# Patient Record
Sex: FEMALE | Race: BLACK OR AFRICAN AMERICAN | NOT HISPANIC OR LATINO | Employment: FULL TIME | URBAN - METROPOLITAN AREA
[De-identification: names, ages, dates, MRNs, and addresses within clinical notes are randomized per-mention and may not be internally consistent; named-entity substitution may affect disease eponyms.]

---

## 2018-03-13 LAB
HUMAN PAPILLOMAVIRUS (HPV): NORMAL
PAP RECOMMENDATION EXT: NORMAL
PAP SMEAR: NORMAL

## 2020-09-16 ENCOUNTER — LAB VISIT (OUTPATIENT)
Dept: LAB | Facility: OTHER | Age: 40
End: 2020-09-16
Payer: MEDICAID

## 2020-09-16 DIAGNOSIS — Z03.818 ENCOUNTER FOR OBSERVATION FOR SUSPECTED EXPOSURE TO OTHER BIOLOGICAL AGENTS RULED OUT: ICD-10-CM

## 2020-09-16 PROCEDURE — U0003 INFECTIOUS AGENT DETECTION BY NUCLEIC ACID (DNA OR RNA); SEVERE ACUTE RESPIRATORY SYNDROME CORONAVIRUS 2 (SARS-COV-2) (CORONAVIRUS DISEASE [COVID-19]), AMPLIFIED PROBE TECHNIQUE, MAKING USE OF HIGH THROUGHPUT TECHNOLOGIES AS DESCRIBED BY CMS-2020-01-R: HCPCS

## 2020-09-17 LAB — SARS-COV-2 RNA RESP QL NAA+PROBE: NOT DETECTED

## 2020-09-22 ENCOUNTER — LAB VISIT (OUTPATIENT)
Dept: LAB | Facility: OTHER | Age: 40
End: 2020-09-22
Payer: MEDICAID

## 2020-09-22 DIAGNOSIS — Z03.818 ENCOUNTER FOR OBSERVATION FOR SUSPECTED EXPOSURE TO OTHER BIOLOGICAL AGENTS RULED OUT: ICD-10-CM

## 2020-09-22 PROCEDURE — U0003 INFECTIOUS AGENT DETECTION BY NUCLEIC ACID (DNA OR RNA); SEVERE ACUTE RESPIRATORY SYNDROME CORONAVIRUS 2 (SARS-COV-2) (CORONAVIRUS DISEASE [COVID-19]), AMPLIFIED PROBE TECHNIQUE, MAKING USE OF HIGH THROUGHPUT TECHNOLOGIES AS DESCRIBED BY CMS-2020-01-R: HCPCS

## 2020-09-23 LAB — SARS-COV-2 RNA RESP QL NAA+PROBE: NOT DETECTED

## 2020-10-06 ENCOUNTER — LAB VISIT (OUTPATIENT)
Dept: LAB | Facility: OTHER | Age: 40
End: 2020-10-06
Payer: COMMERCIAL

## 2020-10-06 DIAGNOSIS — Z03.818 ENCOUNTER FOR OBSERVATION FOR SUSPECTED EXPOSURE TO OTHER BIOLOGICAL AGENTS RULED OUT: ICD-10-CM

## 2020-10-06 PROCEDURE — U0003 INFECTIOUS AGENT DETECTION BY NUCLEIC ACID (DNA OR RNA); SEVERE ACUTE RESPIRATORY SYNDROME CORONAVIRUS 2 (SARS-COV-2) (CORONAVIRUS DISEASE [COVID-19]), AMPLIFIED PROBE TECHNIQUE, MAKING USE OF HIGH THROUGHPUT TECHNOLOGIES AS DESCRIBED BY CMS-2020-01-R: HCPCS

## 2020-10-07 LAB — SARS-COV-2 RNA RESP QL NAA+PROBE: NOT DETECTED

## 2020-10-13 ENCOUNTER — LAB VISIT (OUTPATIENT)
Dept: LAB | Facility: OTHER | Age: 40
End: 2020-10-13
Attending: INTERNAL MEDICINE
Payer: COMMERCIAL

## 2020-10-13 DIAGNOSIS — Z03.818 ENCOUNTER FOR OBSERVATION FOR SUSPECTED EXPOSURE TO OTHER BIOLOGICAL AGENTS RULED OUT: ICD-10-CM

## 2020-10-13 PROCEDURE — U0003 INFECTIOUS AGENT DETECTION BY NUCLEIC ACID (DNA OR RNA); SEVERE ACUTE RESPIRATORY SYNDROME CORONAVIRUS 2 (SARS-COV-2) (CORONAVIRUS DISEASE [COVID-19]), AMPLIFIED PROBE TECHNIQUE, MAKING USE OF HIGH THROUGHPUT TECHNOLOGIES AS DESCRIBED BY CMS-2020-01-R: HCPCS

## 2020-10-14 LAB — SARS-COV-2 RNA RESP QL NAA+PROBE: NOT DETECTED

## 2020-10-20 ENCOUNTER — LAB VISIT (OUTPATIENT)
Dept: LAB | Facility: OTHER | Age: 40
End: 2020-10-20
Payer: COMMERCIAL

## 2020-10-20 DIAGNOSIS — Z03.818 ENCOUNTER FOR OBSERVATION FOR SUSPECTED EXPOSURE TO OTHER BIOLOGICAL AGENTS RULED OUT: ICD-10-CM

## 2020-10-20 PROCEDURE — U0003 INFECTIOUS AGENT DETECTION BY NUCLEIC ACID (DNA OR RNA); SEVERE ACUTE RESPIRATORY SYNDROME CORONAVIRUS 2 (SARS-COV-2) (CORONAVIRUS DISEASE [COVID-19]), AMPLIFIED PROBE TECHNIQUE, MAKING USE OF HIGH THROUGHPUT TECHNOLOGIES AS DESCRIBED BY CMS-2020-01-R: HCPCS

## 2020-10-22 LAB — SARS-COV-2 RNA RESP QL NAA+PROBE: NOT DETECTED

## 2020-10-27 ENCOUNTER — LAB VISIT (OUTPATIENT)
Dept: LAB | Facility: OTHER | Age: 40
End: 2020-10-27
Attending: INTERNAL MEDICINE
Payer: COMMERCIAL

## 2020-10-27 DIAGNOSIS — Z03.818 ENCOUNTER FOR OBSERVATION FOR SUSPECTED EXPOSURE TO OTHER BIOLOGICAL AGENTS RULED OUT: ICD-10-CM

## 2020-10-27 PROCEDURE — U0003 INFECTIOUS AGENT DETECTION BY NUCLEIC ACID (DNA OR RNA); SEVERE ACUTE RESPIRATORY SYNDROME CORONAVIRUS 2 (SARS-COV-2) (CORONAVIRUS DISEASE [COVID-19]), AMPLIFIED PROBE TECHNIQUE, MAKING USE OF HIGH THROUGHPUT TECHNOLOGIES AS DESCRIBED BY CMS-2020-01-R: HCPCS

## 2020-10-28 LAB — SARS-COV-2 RNA RESP QL NAA+PROBE: NOT DETECTED

## 2020-11-03 ENCOUNTER — HOSPITAL ENCOUNTER (EMERGENCY)
Facility: HOSPITAL | Age: 40
Discharge: HOME | End: 2020-11-03
Attending: EMERGENCY MEDICINE
Payer: COMMERCIAL

## 2020-11-03 VITALS
BODY MASS INDEX: 45.99 KG/M2 | TEMPERATURE: 99 F | DIASTOLIC BLOOD PRESSURE: 99 MMHG | HEIGHT: 67 IN | SYSTOLIC BLOOD PRESSURE: 156 MMHG | OXYGEN SATURATION: 97 % | RESPIRATION RATE: 20 BRPM | WEIGHT: 293 LBS | HEART RATE: 82 BPM

## 2020-11-03 DIAGNOSIS — M54.31 SCIATICA OF RIGHT SIDE: Primary | ICD-10-CM

## 2020-11-03 LAB
B-HCG UR QL: NEGATIVE
CTP QC/QA: YES

## 2020-11-03 PROCEDURE — 81025 URINE PREGNANCY TEST: CPT | Performed by: NURSE PRACTITIONER

## 2020-11-03 PROCEDURE — 96372 THER/PROPH/DIAG INJ SC/IM: CPT | Mod: 59

## 2020-11-03 PROCEDURE — 99284 EMERGENCY DEPT VISIT MOD MDM: CPT | Mod: 25

## 2020-11-03 PROCEDURE — 63600175 PHARM REV CODE 636 W HCPCS: Performed by: NURSE PRACTITIONER

## 2020-11-03 RX ORDER — METHYLPREDNISOLONE 4 MG/1
TABLET ORAL
Qty: 1 PACKAGE | Refills: 0 | Status: SHIPPED | OUTPATIENT
Start: 2020-11-03 | End: 2023-12-18

## 2020-11-03 RX ORDER — DEXAMETHASONE SODIUM PHOSPHATE 4 MG/ML
10 INJECTION, SOLUTION INTRA-ARTICULAR; INTRALESIONAL; INTRAMUSCULAR; INTRAVENOUS; SOFT TISSUE
Status: COMPLETED | OUTPATIENT
Start: 2020-11-03 | End: 2020-11-03

## 2020-11-03 RX ADMIN — DEXAMETHASONE SODIUM PHOSPHATE 10 MG: 4 INJECTION, SOLUTION INTRAMUSCULAR; INTRAVENOUS at 03:11

## 2020-11-03 NOTE — ED PROVIDER NOTES
Encounter Date: 11/3/2020       History     Chief Complaint   Patient presents with    Back Pain     ITS MY SCIATIC NERVE     Presents with complaint of low back pain radiating down right leg.  Onset PTA  Pt has history of sciatic nerve pain.  Denies injury         Review of patient's allergies indicates:  No Known Allergies  Past Medical History:   Diagnosis Date    Hypertension     Obese     Sciatica      History reviewed. No pertinent surgical history.  No family history on file.  Social History     Tobacco Use    Smoking status: Never Smoker   Substance Use Topics    Alcohol use: Yes    Drug use: Not on file     Review of Systems   Constitutional: Negative for fever.   Respiratory: Negative for cough, shortness of breath and wheezing.    Cardiovascular: Negative for chest pain, palpitations and leg swelling.   Gastrointestinal: Negative for abdominal pain, diarrhea, nausea and vomiting.   Genitourinary: Negative for dysuria.   Musculoskeletal: Positive for back pain.        Radiating down right leg.   Skin: Negative for rash.   Neurological: Negative for weakness.       Physical Exam     Initial Vitals [11/03/20 1510]   BP Pulse Resp Temp SpO2   (!) 156/99 82 20 98.5 °F (36.9 °C) 97 %      MAP       --         Physical Exam    Constitutional: She appears well-developed and well-nourished.   HENT:   Head: Normocephalic and atraumatic.   Mouth/Throat: Oropharynx is clear and moist.   Eyes: Conjunctivae are normal.   Neck: Normal range of motion.   Cardiovascular: Normal rate, regular rhythm and normal heart sounds.   Pulmonary/Chest: Breath sounds normal. No respiratory distress. She has no wheezes. She has no rhonchi.   Musculoskeletal: Normal range of motion.      Comments: Straight leg testing neg bilateral  Neg bony tenderness  Neg evidence of muscle spasm.  Pt is morbidly obese   Neurological: She is alert and oriented to person, place, and time. No sensory deficit. GCS score is 15. GCS eye subscore is  4. GCS verbal subscore is 5. GCS motor subscore is 6.   Skin: Skin is warm and dry. Capillary refill takes less than 2 seconds.   Psychiatric: She has a normal mood and affect. Thought content normal.         ED Course   Procedures  Labs Reviewed   POCT URINE PREGNANCY          Imaging Results    None          Medical Decision Making:   Initial Assessment:   Presents with complaint of low back pain radiating down right leg.  Pt has history of sciatic nerve pain.  Denies injury  Differential Diagnosis:   Back pain  ED Management:  Pt has been given steroid injection here in the ED    She reports a decrease in her pain level  She will be given medrol dose pain for use at home.  She is ambulatory per self   Have discussed this pt with Dr. Alejandra                               Clinical Impression:     ICD-10-CM ICD-9-CM   1. Sciatica of right side  M54.31 724.3                          ED Disposition Condition    Discharge Stable        ED Prescriptions     Medication Sig Dispense Start Date End Date Auth. Provider    methylPREDNISolone (MEDROL DOSEPACK) 4 mg tablet Take as directed 1 Package 11/3/2020  Ирина Young NP        Follow-up Information     Follow up With Specialties Details Why Contact Info      In 2 days  Make a follow up appointment with your PCP                                       Ирина Young NP  11/03/20 6400

## 2020-11-06 ENCOUNTER — HOSPITAL ENCOUNTER (OUTPATIENT)
Dept: RADIOLOGY | Facility: HOSPITAL | Age: 40
Discharge: HOME | End: 2020-11-06
Attending: NURSE PRACTITIONER
Payer: COMMERCIAL

## 2020-11-06 DIAGNOSIS — M54.2 CERVICALGIA: ICD-10-CM

## 2020-11-06 DIAGNOSIS — M54.2 CERVICALGIA: Primary | ICD-10-CM

## 2020-11-06 PROCEDURE — 72110 X-RAY EXAM L-2 SPINE 4/>VWS: CPT | Mod: TC,PO

## 2020-11-09 ENCOUNTER — HOSPITAL ENCOUNTER (OUTPATIENT)
Dept: RADIOLOGY | Facility: HOSPITAL | Age: 40
Discharge: HOME | End: 2020-11-09
Attending: NURSE PRACTITIONER
Payer: COMMERCIAL

## 2020-11-09 DIAGNOSIS — M46.1 SACROILIITIS, NOT ELSEWHERE CLASSIFIED: ICD-10-CM

## 2020-11-09 DIAGNOSIS — M54.00 PANNICULITIS AFFECTING BACK: ICD-10-CM

## 2020-11-09 DIAGNOSIS — M54.50 LUMBAGO: ICD-10-CM

## 2020-11-09 DIAGNOSIS — T84.110A: ICD-10-CM

## 2020-11-09 DIAGNOSIS — M54.50 LUMBAGO: Primary | ICD-10-CM

## 2020-11-09 PROCEDURE — 73552 X-RAY EXAM OF FEMUR 2/>: CPT | Mod: TC,RT

## 2020-11-09 PROCEDURE — 73502 X-RAY EXAM HIP UNI 2-3 VIEWS: CPT | Mod: TC,RT

## 2020-11-17 ENCOUNTER — LAB VISIT (OUTPATIENT)
Dept: LAB | Facility: OTHER | Age: 40
End: 2020-11-17
Payer: COMMERCIAL

## 2020-11-17 DIAGNOSIS — Z03.818 ENCOUNTER FOR OBSERVATION FOR SUSPECTED EXPOSURE TO OTHER BIOLOGICAL AGENTS RULED OUT: ICD-10-CM

## 2020-11-17 PROCEDURE — U0003 INFECTIOUS AGENT DETECTION BY NUCLEIC ACID (DNA OR RNA); SEVERE ACUTE RESPIRATORY SYNDROME CORONAVIRUS 2 (SARS-COV-2) (CORONAVIRUS DISEASE [COVID-19]), AMPLIFIED PROBE TECHNIQUE, MAKING USE OF HIGH THROUGHPUT TECHNOLOGIES AS DESCRIBED BY CMS-2020-01-R: HCPCS

## 2020-11-19 LAB — SARS-COV-2 RNA RESP QL NAA+PROBE: NOT DETECTED

## 2020-11-24 ENCOUNTER — LAB VISIT (OUTPATIENT)
Dept: LAB | Facility: OTHER | Age: 40
End: 2020-11-24
Payer: COMMERCIAL

## 2020-11-24 DIAGNOSIS — Z03.818 ENCOUNTER FOR OBSERVATION FOR SUSPECTED EXPOSURE TO OTHER BIOLOGICAL AGENTS RULED OUT: ICD-10-CM

## 2020-11-24 PROCEDURE — U0003 INFECTIOUS AGENT DETECTION BY NUCLEIC ACID (DNA OR RNA); SEVERE ACUTE RESPIRATORY SYNDROME CORONAVIRUS 2 (SARS-COV-2) (CORONAVIRUS DISEASE [COVID-19]), AMPLIFIED PROBE TECHNIQUE, MAKING USE OF HIGH THROUGHPUT TECHNOLOGIES AS DESCRIBED BY CMS-2020-01-R: HCPCS

## 2020-11-27 LAB — SARS-COV-2 RNA RESP QL NAA+PROBE: NOT DETECTED

## 2020-12-01 ENCOUNTER — LAB VISIT (OUTPATIENT)
Dept: LAB | Facility: OTHER | Age: 40
End: 2020-12-01
Payer: COMMERCIAL

## 2020-12-01 DIAGNOSIS — Z03.818 ENCOUNTER FOR OBSERVATION FOR SUSPECTED EXPOSURE TO OTHER BIOLOGICAL AGENTS RULED OUT: ICD-10-CM

## 2020-12-01 PROCEDURE — U0003 INFECTIOUS AGENT DETECTION BY NUCLEIC ACID (DNA OR RNA); SEVERE ACUTE RESPIRATORY SYNDROME CORONAVIRUS 2 (SARS-COV-2) (CORONAVIRUS DISEASE [COVID-19]), AMPLIFIED PROBE TECHNIQUE, MAKING USE OF HIGH THROUGHPUT TECHNOLOGIES AS DESCRIBED BY CMS-2020-01-R: HCPCS

## 2020-12-03 LAB — SARS-COV-2 RNA RESP QL NAA+PROBE: NOT DETECTED

## 2020-12-08 ENCOUNTER — LAB VISIT (OUTPATIENT)
Dept: LAB | Facility: OTHER | Age: 40
End: 2020-12-08
Payer: COMMERCIAL

## 2020-12-08 DIAGNOSIS — Z03.818 ENCOUNTER FOR OBSERVATION FOR SUSPECTED EXPOSURE TO OTHER BIOLOGICAL AGENTS RULED OUT: ICD-10-CM

## 2020-12-08 PROCEDURE — U0003 INFECTIOUS AGENT DETECTION BY NUCLEIC ACID (DNA OR RNA); SEVERE ACUTE RESPIRATORY SYNDROME CORONAVIRUS 2 (SARS-COV-2) (CORONAVIRUS DISEASE [COVID-19]), AMPLIFIED PROBE TECHNIQUE, MAKING USE OF HIGH THROUGHPUT TECHNOLOGIES AS DESCRIBED BY CMS-2020-01-R: HCPCS

## 2020-12-10 LAB — SARS-COV-2 RNA RESP QL NAA+PROBE: NOT DETECTED

## 2020-12-15 ENCOUNTER — LAB VISIT (OUTPATIENT)
Dept: LAB | Facility: OTHER | Age: 40
End: 2020-12-15
Attending: INTERNAL MEDICINE
Payer: COMMERCIAL

## 2020-12-15 DIAGNOSIS — Z03.818 ENCOUNTER FOR OBSERVATION FOR SUSPECTED EXPOSURE TO OTHER BIOLOGICAL AGENTS RULED OUT: ICD-10-CM

## 2020-12-15 PROCEDURE — U0003 INFECTIOUS AGENT DETECTION BY NUCLEIC ACID (DNA OR RNA); SEVERE ACUTE RESPIRATORY SYNDROME CORONAVIRUS 2 (SARS-COV-2) (CORONAVIRUS DISEASE [COVID-19]), AMPLIFIED PROBE TECHNIQUE, MAKING USE OF HIGH THROUGHPUT TECHNOLOGIES AS DESCRIBED BY CMS-2020-01-R: HCPCS

## 2020-12-17 LAB — SARS-COV-2 RNA RESP QL NAA+PROBE: NOT DETECTED

## 2020-12-29 ENCOUNTER — LAB VISIT (OUTPATIENT)
Dept: LAB | Facility: OTHER | Age: 40
End: 2020-12-29
Payer: COMMERCIAL

## 2020-12-29 DIAGNOSIS — Z03.818 ENCOUNTER FOR OBSERVATION FOR SUSPECTED EXPOSURE TO OTHER BIOLOGICAL AGENTS RULED OUT: ICD-10-CM

## 2020-12-29 PROCEDURE — U0003 INFECTIOUS AGENT DETECTION BY NUCLEIC ACID (DNA OR RNA); SEVERE ACUTE RESPIRATORY SYNDROME CORONAVIRUS 2 (SARS-COV-2) (CORONAVIRUS DISEASE [COVID-19]), AMPLIFIED PROBE TECHNIQUE, MAKING USE OF HIGH THROUGHPUT TECHNOLOGIES AS DESCRIBED BY CMS-2020-01-R: HCPCS

## 2020-12-31 LAB — SARS-COV-2 RNA RESP QL NAA+PROBE: NOT DETECTED

## 2021-01-05 ENCOUNTER — LAB VISIT (OUTPATIENT)
Dept: LAB | Facility: OTHER | Age: 41
End: 2021-01-05
Payer: COMMERCIAL

## 2021-01-05 DIAGNOSIS — Z03.818 ENCOUNTER FOR OBSERVATION FOR SUSPECTED EXPOSURE TO OTHER BIOLOGICAL AGENTS RULED OUT: ICD-10-CM

## 2021-01-05 PROCEDURE — U0003 INFECTIOUS AGENT DETECTION BY NUCLEIC ACID (DNA OR RNA); SEVERE ACUTE RESPIRATORY SYNDROME CORONAVIRUS 2 (SARS-COV-2) (CORONAVIRUS DISEASE [COVID-19]), AMPLIFIED PROBE TECHNIQUE, MAKING USE OF HIGH THROUGHPUT TECHNOLOGIES AS DESCRIBED BY CMS-2020-01-R: HCPCS

## 2021-01-07 LAB — SARS-COV-2 RNA RESP QL NAA+PROBE: NOT DETECTED

## 2021-01-12 ENCOUNTER — LAB VISIT (OUTPATIENT)
Dept: LAB | Facility: OTHER | Age: 41
End: 2021-01-12
Payer: COMMERCIAL

## 2021-01-12 DIAGNOSIS — Z20.822 ENCOUNTER FOR LABORATORY TESTING FOR COVID-19 VIRUS: ICD-10-CM

## 2021-01-12 PROCEDURE — U0003 INFECTIOUS AGENT DETECTION BY NUCLEIC ACID (DNA OR RNA); SEVERE ACUTE RESPIRATORY SYNDROME CORONAVIRUS 2 (SARS-COV-2) (CORONAVIRUS DISEASE [COVID-19]), AMPLIFIED PROBE TECHNIQUE, MAKING USE OF HIGH THROUGHPUT TECHNOLOGIES AS DESCRIBED BY CMS-2020-01-R: HCPCS

## 2021-01-14 LAB — SARS-COV-2 RNA RESP QL NAA+PROBE: NOT DETECTED

## 2021-01-19 ENCOUNTER — LAB VISIT (OUTPATIENT)
Dept: LAB | Facility: OTHER | Age: 41
End: 2021-01-19
Payer: COMMERCIAL

## 2021-01-19 DIAGNOSIS — Z20.822 ENCOUNTER FOR LABORATORY TESTING FOR COVID-19 VIRUS: ICD-10-CM

## 2021-01-19 PROCEDURE — U0003 INFECTIOUS AGENT DETECTION BY NUCLEIC ACID (DNA OR RNA); SEVERE ACUTE RESPIRATORY SYNDROME CORONAVIRUS 2 (SARS-COV-2) (CORONAVIRUS DISEASE [COVID-19]), AMPLIFIED PROBE TECHNIQUE, MAKING USE OF HIGH THROUGHPUT TECHNOLOGIES AS DESCRIBED BY CMS-2020-01-R: HCPCS

## 2021-01-20 LAB — SARS-COV-2 RNA RESP QL NAA+PROBE: NOT DETECTED

## 2021-02-02 ENCOUNTER — LAB VISIT (OUTPATIENT)
Dept: LAB | Facility: OTHER | Age: 41
End: 2021-02-02
Payer: COMMERCIAL

## 2021-02-02 DIAGNOSIS — Z20.822 ENCOUNTER FOR LABORATORY TESTING FOR COVID-19 VIRUS: ICD-10-CM

## 2021-02-02 PROCEDURE — U0003 INFECTIOUS AGENT DETECTION BY NUCLEIC ACID (DNA OR RNA); SEVERE ACUTE RESPIRATORY SYNDROME CORONAVIRUS 2 (SARS-COV-2) (CORONAVIRUS DISEASE [COVID-19]), AMPLIFIED PROBE TECHNIQUE, MAKING USE OF HIGH THROUGHPUT TECHNOLOGIES AS DESCRIBED BY CMS-2020-01-R: HCPCS

## 2021-02-03 LAB — SARS-COV-2 RNA RESP QL NAA+PROBE: NOT DETECTED

## 2021-02-09 ENCOUNTER — LAB VISIT (OUTPATIENT)
Dept: LAB | Facility: OTHER | Age: 41
End: 2021-02-09
Payer: COMMERCIAL

## 2021-02-09 DIAGNOSIS — Z20.822 ENCOUNTER FOR LABORATORY TESTING FOR COVID-19 VIRUS: ICD-10-CM

## 2021-02-09 PROCEDURE — U0003 INFECTIOUS AGENT DETECTION BY NUCLEIC ACID (DNA OR RNA); SEVERE ACUTE RESPIRATORY SYNDROME CORONAVIRUS 2 (SARS-COV-2) (CORONAVIRUS DISEASE [COVID-19]), AMPLIFIED PROBE TECHNIQUE, MAKING USE OF HIGH THROUGHPUT TECHNOLOGIES AS DESCRIBED BY CMS-2020-01-R: HCPCS

## 2021-02-10 LAB — SARS-COV-2 RNA RESP QL NAA+PROBE: NOT DETECTED

## 2021-02-18 ENCOUNTER — LAB VISIT (OUTPATIENT)
Dept: LAB | Facility: OTHER | Age: 41
End: 2021-02-18
Payer: COMMERCIAL

## 2021-02-18 DIAGNOSIS — Z20.822 ENCOUNTER FOR LABORATORY TESTING FOR COVID-19 VIRUS: ICD-10-CM

## 2021-02-18 PROCEDURE — U0003 INFECTIOUS AGENT DETECTION BY NUCLEIC ACID (DNA OR RNA); SEVERE ACUTE RESPIRATORY SYNDROME CORONAVIRUS 2 (SARS-COV-2) (CORONAVIRUS DISEASE [COVID-19]), AMPLIFIED PROBE TECHNIQUE, MAKING USE OF HIGH THROUGHPUT TECHNOLOGIES AS DESCRIBED BY CMS-2020-01-R: HCPCS

## 2021-02-19 LAB — SARS-COV-2 RNA RESP QL NAA+PROBE: NOT DETECTED

## 2021-02-23 ENCOUNTER — LAB VISIT (OUTPATIENT)
Dept: LAB | Facility: OTHER | Age: 41
End: 2021-02-23
Payer: COMMERCIAL

## 2021-02-23 DIAGNOSIS — Z20.822 ENCOUNTER FOR LABORATORY TESTING FOR COVID-19 VIRUS: ICD-10-CM

## 2021-02-23 PROCEDURE — U0003 INFECTIOUS AGENT DETECTION BY NUCLEIC ACID (DNA OR RNA); SEVERE ACUTE RESPIRATORY SYNDROME CORONAVIRUS 2 (SARS-COV-2) (CORONAVIRUS DISEASE [COVID-19]), AMPLIFIED PROBE TECHNIQUE, MAKING USE OF HIGH THROUGHPUT TECHNOLOGIES AS DESCRIBED BY CMS-2020-01-R: HCPCS

## 2021-02-24 LAB — SARS-COV-2 RNA RESP QL NAA+PROBE: NOT DETECTED

## 2021-03-02 ENCOUNTER — LAB VISIT (OUTPATIENT)
Dept: LAB | Facility: OTHER | Age: 41
End: 2021-03-02
Payer: COMMERCIAL

## 2021-03-02 DIAGNOSIS — Z20.822 ENCOUNTER FOR LABORATORY TESTING FOR COVID-19 VIRUS: ICD-10-CM

## 2021-03-02 PROCEDURE — U0003 INFECTIOUS AGENT DETECTION BY NUCLEIC ACID (DNA OR RNA); SEVERE ACUTE RESPIRATORY SYNDROME CORONAVIRUS 2 (SARS-COV-2) (CORONAVIRUS DISEASE [COVID-19]), AMPLIFIED PROBE TECHNIQUE, MAKING USE OF HIGH THROUGHPUT TECHNOLOGIES AS DESCRIBED BY CMS-2020-01-R: HCPCS

## 2021-03-03 LAB — SARS-COV-2 RNA RESP QL NAA+PROBE: NOT DETECTED

## 2021-03-09 ENCOUNTER — LAB VISIT (OUTPATIENT)
Dept: LAB | Facility: OTHER | Age: 41
End: 2021-03-09
Payer: COMMERCIAL

## 2021-03-09 DIAGNOSIS — Z20.822 ENCOUNTER FOR LABORATORY TESTING FOR COVID-19 VIRUS: ICD-10-CM

## 2021-03-09 PROCEDURE — U0003 INFECTIOUS AGENT DETECTION BY NUCLEIC ACID (DNA OR RNA); SEVERE ACUTE RESPIRATORY SYNDROME CORONAVIRUS 2 (SARS-COV-2) (CORONAVIRUS DISEASE [COVID-19]), AMPLIFIED PROBE TECHNIQUE, MAKING USE OF HIGH THROUGHPUT TECHNOLOGIES AS DESCRIBED BY CMS-2020-01-R: HCPCS | Performed by: NURSE PRACTITIONER

## 2021-03-10 LAB — SARS-COV-2 RNA RESP QL NAA+PROBE: NOT DETECTED

## 2021-03-23 ENCOUNTER — LAB VISIT (OUTPATIENT)
Dept: LAB | Facility: OTHER | Age: 41
End: 2021-03-23
Payer: COMMERCIAL

## 2021-03-23 DIAGNOSIS — Z20.822 ENCOUNTER FOR LABORATORY TESTING FOR COVID-19 VIRUS: ICD-10-CM

## 2021-03-23 PROCEDURE — U0003 INFECTIOUS AGENT DETECTION BY NUCLEIC ACID (DNA OR RNA); SEVERE ACUTE RESPIRATORY SYNDROME CORONAVIRUS 2 (SARS-COV-2) (CORONAVIRUS DISEASE [COVID-19]), AMPLIFIED PROBE TECHNIQUE, MAKING USE OF HIGH THROUGHPUT TECHNOLOGIES AS DESCRIBED BY CMS-2020-01-R: HCPCS | Performed by: NURSE PRACTITIONER

## 2021-03-24 LAB — SARS-COV-2 RNA RESP QL NAA+PROBE: NOT DETECTED

## 2021-04-07 ENCOUNTER — HOSPITAL ENCOUNTER (EMERGENCY)
Facility: HOSPITAL | Age: 41
Discharge: HOME | End: 2021-04-07
Attending: EMERGENCY MEDICINE
Payer: COMMERCIAL

## 2021-04-07 VITALS
SYSTOLIC BLOOD PRESSURE: 129 MMHG | DIASTOLIC BLOOD PRESSURE: 77 MMHG | BODY MASS INDEX: 45.99 KG/M2 | WEIGHT: 293 LBS | RESPIRATION RATE: 20 BRPM | HEIGHT: 67 IN | HEART RATE: 74 BPM | OXYGEN SATURATION: 100 % | TEMPERATURE: 98 F

## 2021-04-07 DIAGNOSIS — R06.02 SOB (SHORTNESS OF BREATH): ICD-10-CM

## 2021-04-07 DIAGNOSIS — Z01.818 PRE-OP TESTING: ICD-10-CM

## 2021-04-07 DIAGNOSIS — F41.1 ANXIETY REACTION: Primary | ICD-10-CM

## 2021-04-07 LAB
ALBUMIN SERPL BCP-MCNC: 3.4 G/DL (ref 3.5–5.2)
ALP SERPL-CCNC: 48 U/L (ref 55–135)
ALT SERPL W/O P-5'-P-CCNC: 13 U/L (ref 10–44)
ANION GAP SERPL CALC-SCNC: 7 MMOL/L (ref 8–16)
AST SERPL-CCNC: 10 U/L (ref 10–40)
BASOPHILS # BLD AUTO: 0.03 K/UL (ref 0–0.2)
BASOPHILS NFR BLD: 0.4 % (ref 0–1.9)
BILIRUB SERPL-MCNC: 0.3 MG/DL (ref 0.1–1)
BUN SERPL-MCNC: 11 MG/DL (ref 6–20)
CALCIUM SERPL-MCNC: 8.9 MG/DL (ref 8.7–10.5)
CHLORIDE SERPL-SCNC: 109 MMOL/L (ref 95–110)
CO2 SERPL-SCNC: 24 MMOL/L (ref 23–29)
CREAT SERPL-MCNC: 0.9 MG/DL (ref 0.5–1.4)
DIFFERENTIAL METHOD: ABNORMAL
EOSINOPHIL # BLD AUTO: 0.2 K/UL (ref 0–0.5)
EOSINOPHIL NFR BLD: 2.2 % (ref 0–8)
ERYTHROCYTE [DISTWIDTH] IN BLOOD BY AUTOMATED COUNT: 12.9 % (ref 11.5–14.5)
EST. GFR  (AFRICAN AMERICAN): >60 ML/MIN/1.73 M^2
EST. GFR  (NON AFRICAN AMERICAN): >60 ML/MIN/1.73 M^2
GLUCOSE SERPL-MCNC: 87 MG/DL (ref 70–110)
HCT VFR BLD AUTO: 36.6 % (ref 37–48.5)
HGB BLD-MCNC: 11.9 G/DL (ref 12–16)
IMM GRANULOCYTES # BLD AUTO: 0.02 K/UL (ref 0–0.04)
IMM GRANULOCYTES NFR BLD AUTO: 0.2 % (ref 0–0.5)
LYMPHOCYTES # BLD AUTO: 3 K/UL (ref 1–4.8)
LYMPHOCYTES NFR BLD: 36.5 % (ref 18–48)
MCH RBC QN AUTO: 29.5 PG (ref 27–31)
MCHC RBC AUTO-ENTMCNC: 32.5 G/DL (ref 32–36)
MCV RBC AUTO: 91 FL (ref 82–98)
MONOCYTES # BLD AUTO: 0.6 K/UL (ref 0.3–1)
MONOCYTES NFR BLD: 6.9 % (ref 4–15)
NEUTROPHILS # BLD AUTO: 4.5 K/UL (ref 1.8–7.7)
NEUTROPHILS NFR BLD: 53.8 % (ref 38–73)
NRBC BLD-RTO: 0 /100 WBC
PLATELET # BLD AUTO: 310 K/UL (ref 150–450)
PMV BLD AUTO: 9.8 FL (ref 9.2–12.9)
POTASSIUM SERPL-SCNC: 3.8 MMOL/L (ref 3.5–5.1)
PROT SERPL-MCNC: 7 G/DL (ref 6–8.4)
RBC # BLD AUTO: 4.03 M/UL (ref 4–5.4)
SODIUM SERPL-SCNC: 140 MMOL/L (ref 136–145)
WBC # BLD AUTO: 8.3 K/UL (ref 3.9–12.7)

## 2021-04-07 PROCEDURE — 93010 EKG 12-LEAD: ICD-10-PCS | Mod: ,,, | Performed by: INTERNAL MEDICINE

## 2021-04-07 PROCEDURE — 85025 COMPLETE CBC W/AUTO DIFF WBC: CPT | Performed by: EMERGENCY MEDICINE

## 2021-04-07 PROCEDURE — 93005 ELECTROCARDIOGRAM TRACING: CPT

## 2021-04-07 PROCEDURE — 80053 COMPREHEN METABOLIC PANEL: CPT | Performed by: EMERGENCY MEDICINE

## 2021-04-07 PROCEDURE — 93010 ELECTROCARDIOGRAM REPORT: CPT | Mod: ,,, | Performed by: INTERNAL MEDICINE

## 2021-04-07 PROCEDURE — 25000003 PHARM REV CODE 250: Performed by: EMERGENCY MEDICINE

## 2021-04-07 PROCEDURE — 99285 EMERGENCY DEPT VISIT HI MDM: CPT | Mod: 25

## 2021-04-07 PROCEDURE — 36415 COLL VENOUS BLD VENIPUNCTURE: CPT | Performed by: EMERGENCY MEDICINE

## 2021-04-07 RX ORDER — AMLODIPINE BESYLATE 10 MG/1
10 TABLET ORAL DAILY
COMMUNITY
End: 2023-12-18 | Stop reason: SDUPTHER

## 2021-04-07 RX ORDER — ALPRAZOLAM 0.25 MG/1
0.25 TABLET ORAL 3 TIMES DAILY PRN
Qty: 15 TABLET | Refills: 0 | Status: SHIPPED | OUTPATIENT
Start: 2021-04-07 | End: 2023-12-18 | Stop reason: SDUPTHER

## 2021-04-07 RX ORDER — HYDROCODONE BITARTRATE AND ACETAMINOPHEN 5; 325 MG/1; MG/1
1 TABLET ORAL EVERY 6 HOURS PRN
COMMUNITY
End: 2023-12-18

## 2021-04-07 RX ORDER — AMOXICILLIN 500 MG/1
500 CAPSULE ORAL
COMMUNITY
End: 2023-12-18

## 2021-04-07 RX ORDER — IBUPROFEN 800 MG/1
800 TABLET ORAL 3 TIMES DAILY
COMMUNITY
End: 2023-12-18

## 2021-04-07 RX ORDER — ALPRAZOLAM 1 MG/1
1 TABLET ORAL
Status: COMPLETED | OUTPATIENT
Start: 2021-04-07 | End: 2021-04-07

## 2021-04-07 RX ADMIN — ALPRAZOLAM 1 MG: 1 TABLET ORAL at 08:04

## 2021-04-29 ENCOUNTER — PATIENT MESSAGE (OUTPATIENT)
Dept: RESEARCH | Facility: HOSPITAL | Age: 41
End: 2021-04-29

## 2021-07-27 ENCOUNTER — LAB VISIT (OUTPATIENT)
Dept: LAB | Facility: OTHER | Age: 41
End: 2021-07-27
Payer: COMMERCIAL

## 2021-07-27 DIAGNOSIS — Z20.822 ENCOUNTER FOR LABORATORY TESTING FOR COVID-19 VIRUS: ICD-10-CM

## 2021-07-27 PROCEDURE — U0003 INFECTIOUS AGENT DETECTION BY NUCLEIC ACID (DNA OR RNA); SEVERE ACUTE RESPIRATORY SYNDROME CORONAVIRUS 2 (SARS-COV-2) (CORONAVIRUS DISEASE [COVID-19]), AMPLIFIED PROBE TECHNIQUE, MAKING USE OF HIGH THROUGHPUT TECHNOLOGIES AS DESCRIBED BY CMS-2020-01-R: HCPCS | Performed by: NURSE PRACTITIONER

## 2021-07-29 LAB
SARS-COV-2 RNA RESP QL NAA+PROBE: NOT DETECTED
SARS-COV-2- CYCLE NUMBER: -1

## 2021-08-17 ENCOUNTER — LAB VISIT (OUTPATIENT)
Dept: LAB | Facility: OTHER | Age: 41
End: 2021-08-17
Payer: COMMERCIAL

## 2021-08-17 DIAGNOSIS — Z20.822 ENCOUNTER FOR LABORATORY TESTING FOR COVID-19 VIRUS: ICD-10-CM

## 2021-08-17 PROCEDURE — U0003 INFECTIOUS AGENT DETECTION BY NUCLEIC ACID (DNA OR RNA); SEVERE ACUTE RESPIRATORY SYNDROME CORONAVIRUS 2 (SARS-COV-2) (CORONAVIRUS DISEASE [COVID-19]), AMPLIFIED PROBE TECHNIQUE, MAKING USE OF HIGH THROUGHPUT TECHNOLOGIES AS DESCRIBED BY CMS-2020-01-R: HCPCS | Performed by: NURSE PRACTITIONER

## 2021-08-20 LAB
SARS-COV-2 RNA RESP QL NAA+PROBE: NORMAL
TEST PERFORMANCE INFO SPEC: NORMAL

## 2021-12-28 ENCOUNTER — LAB VISIT (OUTPATIENT)
Dept: LAB | Facility: OTHER | Age: 41
End: 2021-12-28
Payer: COMMERCIAL

## 2021-12-28 DIAGNOSIS — Z20.822 ENCOUNTER FOR LABORATORY TESTING FOR COVID-19 VIRUS: ICD-10-CM

## 2021-12-28 PROCEDURE — U0003 INFECTIOUS AGENT DETECTION BY NUCLEIC ACID (DNA OR RNA); SEVERE ACUTE RESPIRATORY SYNDROME CORONAVIRUS 2 (SARS-COV-2) (CORONAVIRUS DISEASE [COVID-19]), AMPLIFIED PROBE TECHNIQUE, MAKING USE OF HIGH THROUGHPUT TECHNOLOGIES AS DESCRIBED BY CMS-2020-01-R: HCPCS | Performed by: NURSE PRACTITIONER

## 2021-12-29 LAB
SARS-COV-2 RNA RESP QL NAA+PROBE: NOT DETECTED
SARS-COV-2- CYCLE NUMBER: NORMAL

## 2022-01-04 ENCOUNTER — LAB VISIT (OUTPATIENT)
Dept: LAB | Facility: OTHER | Age: 42
End: 2022-01-04
Payer: COMMERCIAL

## 2022-01-04 DIAGNOSIS — Z20.822 ENCOUNTER FOR LABORATORY TESTING FOR COVID-19 VIRUS: ICD-10-CM

## 2022-01-05 PROCEDURE — U0003 INFECTIOUS AGENT DETECTION BY NUCLEIC ACID (DNA OR RNA); SEVERE ACUTE RESPIRATORY SYNDROME CORONAVIRUS 2 (SARS-COV-2) (CORONAVIRUS DISEASE [COVID-19]), AMPLIFIED PROBE TECHNIQUE, MAKING USE OF HIGH THROUGHPUT TECHNOLOGIES AS DESCRIBED BY CMS-2020-01-R: HCPCS | Mod: ST72 | Performed by: NURSE PRACTITIONER

## 2022-01-10 LAB — SARS-COV-2 RNA RESP QL NAA+PROBE: NOT DETECTED

## 2023-12-18 ENCOUNTER — OFFICE VISIT (OUTPATIENT)
Dept: FAMILY MEDICINE | Facility: CLINIC | Age: 43
End: 2023-12-18
Attending: FAMILY MEDICINE
Payer: COMMERCIAL

## 2023-12-18 ENCOUNTER — TELEPHONE (OUTPATIENT)
Dept: FAMILY MEDICINE | Facility: CLINIC | Age: 43
End: 2023-12-18

## 2023-12-18 VITALS
WEIGHT: 260 LBS | HEART RATE: 61 BPM | BODY MASS INDEX: 40.81 KG/M2 | HEIGHT: 67 IN | DIASTOLIC BLOOD PRESSURE: 80 MMHG | OXYGEN SATURATION: 99 % | SYSTOLIC BLOOD PRESSURE: 128 MMHG

## 2023-12-18 DIAGNOSIS — Z12.31 OTHER SCREENING MAMMOGRAM: ICD-10-CM

## 2023-12-18 DIAGNOSIS — Z13.220 SCREENING CHOLESTEROL LEVEL: ICD-10-CM

## 2023-12-18 DIAGNOSIS — Z98.84 S/P BARIATRIC SURGERY: ICD-10-CM

## 2023-12-18 DIAGNOSIS — R79.9 ABNORMAL FINDING OF BLOOD CHEMISTRY, UNSPECIFIED: ICD-10-CM

## 2023-12-18 DIAGNOSIS — F41.9 ANXIETY: ICD-10-CM

## 2023-12-18 DIAGNOSIS — F51.04 PSYCHOPHYSIOLOGICAL INSOMNIA: ICD-10-CM

## 2023-12-18 DIAGNOSIS — M54.16 LUMBAR RADICULOPATHY: ICD-10-CM

## 2023-12-18 DIAGNOSIS — Z00.00 ENCOUNTER FOR MEDICAL EXAMINATION TO ESTABLISH CARE: Primary | ICD-10-CM

## 2023-12-18 DIAGNOSIS — Z13.1 SCREENING FOR DIABETES MELLITUS: ICD-10-CM

## 2023-12-18 DIAGNOSIS — I10 ESSENTIAL HYPERTENSION: ICD-10-CM

## 2023-12-18 DIAGNOSIS — R53.82 CHRONIC FATIGUE: ICD-10-CM

## 2023-12-18 DIAGNOSIS — E66.01 CLASS 3 SEVERE OBESITY WITH SERIOUS COMORBIDITY AND BODY MASS INDEX (BMI) OF 40.0 TO 44.9 IN ADULT, UNSPECIFIED OBESITY TYPE: ICD-10-CM

## 2023-12-18 PROCEDURE — 3079F DIAST BP 80-89 MM HG: CPT | Mod: CPTII,S$GLB,,

## 2023-12-18 PROCEDURE — 99204 OFFICE O/P NEW MOD 45 MIN: CPT | Mod: S$GLB,,,

## 2023-12-18 PROCEDURE — 1159F MED LIST DOCD IN RCRD: CPT | Mod: CPTII,S$GLB,,

## 2023-12-18 PROCEDURE — 3008F BODY MASS INDEX DOCD: CPT | Mod: CPTII,S$GLB,,

## 2023-12-18 PROCEDURE — 3074F SYST BP LT 130 MM HG: CPT | Mod: CPTII,S$GLB,,

## 2023-12-18 RX ORDER — GABAPENTIN 400 MG/1
400 CAPSULE ORAL 3 TIMES DAILY
COMMUNITY
Start: 2023-08-16 | End: 2023-12-18

## 2023-12-18 RX ORDER — HYDROCHLOROTHIAZIDE 12.5 MG/1
12.5 TABLET ORAL DAILY
Qty: 90 TABLET | Refills: 3 | Status: SHIPPED | OUTPATIENT
Start: 2023-12-18 | End: 2024-12-17

## 2023-12-18 RX ORDER — IBUPROFEN 100 MG/5ML
1000 SUSPENSION, ORAL (FINAL DOSE FORM) ORAL DAILY
COMMUNITY

## 2023-12-18 RX ORDER — ALPRAZOLAM 0.25 MG/1
0.25 TABLET ORAL NIGHTLY PRN
Qty: 90 TABLET | Refills: 0 | Status: SHIPPED | OUTPATIENT
Start: 2023-12-18 | End: 2023-12-18

## 2023-12-18 RX ORDER — VITAMIN B COMPLEX
1 CAPSULE ORAL DAILY
COMMUNITY

## 2023-12-18 RX ORDER — FERROUS SULFATE, DRIED 160(50) MG
1 TABLET, EXTENDED RELEASE ORAL 2 TIMES DAILY WITH MEALS
COMMUNITY

## 2023-12-18 RX ORDER — DULOXETIN HYDROCHLORIDE 30 MG/1
30 CAPSULE, DELAYED RELEASE ORAL DAILY
Qty: 30 CAPSULE | Refills: 11 | Status: SHIPPED | OUTPATIENT
Start: 2023-12-18 | End: 2024-01-29

## 2023-12-18 RX ORDER — AMLODIPINE BESYLATE 10 MG/1
10 TABLET ORAL DAILY
Qty: 90 TABLET | Refills: 3 | Status: SHIPPED | OUTPATIENT
Start: 2023-12-18 | End: 2024-12-17

## 2023-12-18 RX ORDER — DOCUSATE SODIUM 100 MG/1
100 CAPSULE, LIQUID FILLED ORAL 2 TIMES DAILY PRN
COMMUNITY

## 2023-12-18 RX ORDER — GABAPENTIN 100 MG/1
100 CAPSULE ORAL 3 TIMES DAILY PRN
Qty: 90 CAPSULE | Refills: 3 | Status: SHIPPED | OUTPATIENT
Start: 2023-12-18 | End: 2024-12-17

## 2023-12-18 RX ORDER — HYDROCHLOROTHIAZIDE 12.5 MG/1
12.5 TABLET ORAL DAILY
COMMUNITY
Start: 2023-09-16 | End: 2023-12-18 | Stop reason: SDUPTHER

## 2023-12-18 RX ORDER — ALPRAZOLAM 0.25 MG/1
0.25 TABLET ORAL NIGHTLY PRN
Qty: 90 TABLET | Refills: 0 | Status: SHIPPED | OUTPATIENT
Start: 2023-12-18 | End: 2024-01-29

## 2023-12-18 NOTE — PROGRESS NOTES
"  SUBJECTIVE:    Patient ID: Vanessa Flower is a 43 y.o. female.    Chief Complaint: Establish Care and restart medications    43 year old female here to establish care. She wants to discuss resuming some medications she has been on in the past.    Medical, surgical, family and social history reviewed, as well as current medications and allergies.    She reports she had a pap smear with Dr Mane last year, time to make an appt.   Needs a mammogram.  Having issues with anxiety, which is affecting her sleep.   She wants to discuss her weight. She has had weight loss surgery. She is still struggling to lose. Has started working out. Walking, gym sometimes, does do some weights, "trains" with her brother.  Tired all the time.  Wants to have labs to check health status.  History of hypertension. Concerned about health risks.  Has issues with "allergies" and always feels congested.  Hx of major injury to right side, hip, knee, ankle, so suffers with chronic arthritis pain. Sciatic nerve pain. Back pain  Suffers with headaches-About 5 migraines a month, sometimes in temple, sometimes in crown of head, light sensitive and gets nauseated    Hepatitis C Screening Never done  TETANUS VACCINE Never done  Mammogram due on 12/22/2024  Lipid Panel Completed     The 10-year CVD risk score (FLORIAN'Agostino, et al., 2008) is: 2.8%    Values used to calculate the score:      Age: 43 years      Sex: Female      Diabetic: No      Tobacco smoker: No      Systolic Blood Pressure: 128 mmHg      Is BP treated: Yes      HDL Cholesterol: 66 mg/dL      Total Cholesterol: 171 mg/dL         Office Visit on 12/18/2023   Component Date Value Ref Range Status    WBC 12/21/2023 3.9  3.8 - 10.8 Thousand/uL Final    RBC 12/21/2023 4.35  3.80 - 5.10 Million/uL Final    Hemoglobin 12/21/2023 13.2  11.7 - 15.5 g/dL Final    Hematocrit 12/21/2023 38.9  35.0 - 45.0 % Final    MCV 12/21/2023 89.4  80.0 - 100.0 fL Final    MCH 12/21/2023 30.3  27.0 - 33.0 pg " Final    MCHC 12/21/2023 33.9  32.0 - 36.0 g/dL Final    RDW 12/21/2023 11.9  11.0 - 15.0 % Final    Platelets 12/21/2023 304  140 - 400 Thousand/uL Final    MPV 12/21/2023 10.6  7.5 - 12.5 fL Final    Neutrophils, Abs 12/21/2023 1,658  1,500 - 7,800 cells/uL Final    Lymph # 12/21/2023 1,884  850 - 3,900 cells/uL Final    Mono # 12/21/2023 269  200 - 950 cells/uL Final    Eos # 12/21/2023 59  15 - 500 cells/uL Final    Baso # 12/21/2023 31  0 - 200 cells/uL Final    Neutrophils Relative 12/21/2023 42.5  % Final    Lymph % 12/21/2023 48.3  % Final    Mono % 12/21/2023 6.9  % Final    Eosinophil % 12/21/2023 1.5  % Final    Basophil % 12/21/2023 0.8  % Final    Iron 12/21/2023 136  40 - 190 mcg/dL Final    TIBC 12/21/2023 287  250 - 450 mcg/dL (calc) Final    Iron Saturation 12/21/2023 47 (H)  16 - 45 % (calc) Final    Ferritin 12/21/2023 75  16 - 232 ng/mL Final    Glucose 12/21/2023 90  65 - 99 mg/dL Final    BUN 12/21/2023 6 (L)  7 - 25 mg/dL Final    Creatinine 12/21/2023 0.90  0.50 - 0.99 mg/dL Final    eGFR 12/21/2023 82  > OR = 60 mL/min/1.73m2 Final    BUN/Creatinine Ratio 12/21/2023 7  6 - 22 (calc) Final    Sodium 12/21/2023 138  135 - 146 mmol/L Final    Potassium 12/21/2023 4.1  3.5 - 5.3 mmol/L Final    Chloride 12/21/2023 104  98 - 110 mmol/L Final    CO2 12/21/2023 28  20 - 32 mmol/L Final    Calcium 12/21/2023 9.2  8.6 - 10.2 mg/dL Final    Total Protein 12/21/2023 6.4  6.1 - 8.1 g/dL Final    Albumin 12/21/2023 3.9  3.6 - 5.1 g/dL Final    Globulin, Total 12/21/2023 2.5  1.9 - 3.7 g/dL (calc) Final    Albumin/Globulin Ratio 12/21/2023 1.6  1.0 - 2.5 (calc) Final    Total Bilirubin 12/21/2023 0.7  0.2 - 1.2 mg/dL Final    Alkaline Phosphatase 12/21/2023 38  31 - 125 U/L Final    AST 12/21/2023 9 (L)  10 - 30 U/L Final    ALT 12/21/2023 8  6 - 29 U/L Final    Hemoglobin A1C 12/21/2023 5.0  <5.7 % of total Hgb Final    Cholesterol 12/21/2023 171  <200 mg/dL Final    HDL 12/21/2023 66  > OR = 50 mg/dL  Final    Triglycerides 2023 55  <150 mg/dL Final    LDL Cholesterol 2023 91  mg/dL (calc) Final    HDL/Cholesterol Ratio 2023 2.6  <5.0 (calc) Final    Non HDL Chol. (LDL+VLDL) 2023 105  <130 mg/dL (calc) Final    Magnesium 2023 2.0  1.5 - 2.5 mg/dL Final    TSH 2023 0.38 (L)  mIU/L Final    T4, Free 2023 0.9  0.8 - 1.8 ng/dL Final    Vitamin D, 25-OH, Total 2023 33  30 - 100 ng/mL Final    Vitamin B-12 2023 716  200 - 1,100 pg/mL Final    Folate 2023 16.9  ng/mL Final    Creatinine, Urine 2023 298 (H)  20 - 275 mg/dL Final    Microalb, Ur 2023 0.6  See Note: mg/dL Final    Microalb/Creat Ratio 2023 2  <30 mcg/mg creat Final       Past Medical History:   Diagnosis Date    Hypertension     Obese     Panic attack     Sciatica      Social History     Socioeconomic History    Marital status: Single   Tobacco Use    Smoking status: Never   Substance and Sexual Activity    Alcohol use: Not Currently    Drug use: Never     Past Surgical History:   Procedure Laterality Date    ANKLE FRACTURE SURGERY       SECTION      CHOLECYSTECTOMY      FEMUR FRACTURE SURGERY      gastric sleeve  2023    PATELLA FRACTURE SURGERY       History reviewed. No pertinent family history.    Review of patient's allergies indicates:  No Known Allergies    Current Outpatient Medications:     ascorbic acid, vitamin C, (VITAMIN C) 1000 MG tablet, Take 1,000 mg by mouth once daily., Disp: , Rfl:     b complex vitamins capsule, Take 1 capsule by mouth once daily., Disp: , Rfl:     calcium-vitamin D3 (OS-ISSA 500 + D3) 500 mg-5 mcg (200 unit) per tablet, Take 1 tablet by mouth 2 (two) times daily with meals., Disp: , Rfl:     docusate sodium (COLACE) 100 MG capsule, Take 100 mg by mouth 2 (two) times daily as needed for Constipation., Disp: , Rfl:     multivit with iron,minerals (FLINTSTONES COMPLETE, IRON, ORAL), Take by mouth Daily., Disp: , Rfl:     ALPRAZolam  "(XANAX) 0.25 MG tablet, Take 1 tablet (0.25 mg total) by mouth nightly as needed for Anxiety or Insomnia., Disp: 90 tablet, Rfl: 0    amLODIPine (NORVASC) 10 MG tablet, Take 1 tablet (10 mg total) by mouth once daily., Disp: 90 tablet, Rfl: 3    DULoxetine (CYMBALTA) 30 MG capsule, Take 1 capsule (30 mg total) by mouth once daily., Disp: 30 capsule, Rfl: 11    gabapentin (NEURONTIN) 100 MG capsule, Take 1 capsule (100 mg total) by mouth 3 (three) times daily as needed (nerve pain)., Disp: 90 capsule, Rfl: 3    hydroCHLOROthiazide (HYDRODIURIL) 12.5 MG Tab, Take 1 tablet (12.5 mg total) by mouth once daily., Disp: 90 tablet, Rfl: 3    Review of Systems   Constitutional:  Positive for activity change and fatigue. Negative for appetite change, chills, fever and unexpected weight change.        Walking, gym sometimes, does do some weights, "trains" with her brother   HENT:  Positive for nasal congestion, postnasal drip, rhinorrhea and sinus pressure/congestion. Negative for ear pain, sore throat and trouble swallowing.    Eyes:  Negative for discharge and visual disturbance.        Blurry vision with headaches   Respiratory:  Negative for apnea, cough, shortness of breath and wheezing.    Cardiovascular:  Negative for chest pain, palpitations and leg swelling.   Gastrointestinal:  Negative for abdominal pain, blood in stool, constipation, diarrhea, nausea, vomiting and reflux.   Genitourinary:  Negative for bladder incontinence, dysuria, frequency, menstrual irregularity, menstrual problem and urgency.   Musculoskeletal:  Positive for arthralgias, back pain and leg pain. Negative for gait problem and myalgias.        Hx of major injury to right side, hip, knee, ankle, so suffers with chronic arthritis pain. Sciatic nerve pain. Back pain   Integumentary:  Negative for rash and mole/lesion.   Allergic/Immunologic: Positive for environmental allergies.   Neurological:  Positive for headaches. Negative for dizziness, " "tremors, seizures, weakness, light-headedness and numbness.        About 5 migraines a month, sometimes in temple, sometimes in crown of head, light sensitive and gets nauseated   Hematological:  Does not bruise/bleed easily.   Psychiatric/Behavioral:  Positive for dysphoric mood and sleep disturbance. Negative for suicidal ideas. The patient is nervous/anxious.            Objective:      Vitals:    12/18/23 1548   BP: 128/80   Pulse: 61   SpO2: 99%   Weight: 117.9 kg (260 lb)   Height: 5' 7" (1.702 m)     Physical Exam  Vitals and nursing note reviewed.   Constitutional:       Appearance: Normal appearance. She is well-developed and well-groomed. She is obese.   HENT:      Head: Normocephalic and atraumatic.      Right Ear: External ear normal.      Left Ear: External ear normal.      Nose: Nose normal. No rhinorrhea.      Mouth/Throat:      Mouth: Mucous membranes are moist.      Pharynx: Oropharynx is clear. No posterior oropharyngeal erythema.   Eyes:      General: No scleral icterus.     Pupils: Pupils are equal, round, and reactive to light.   Neck:      Thyroid: No thyromegaly.      Vascular: No carotid bruit.   Cardiovascular:      Rate and Rhythm: Normal rate and regular rhythm.      Heart sounds: Normal heart sounds. No murmur heard.  Pulmonary:      Effort: Pulmonary effort is normal.      Breath sounds: Normal breath sounds. No wheezing or rales.   Abdominal:      General: Bowel sounds are normal. There is no distension.      Palpations: Abdomen is soft.      Tenderness: There is no abdominal tenderness.   Musculoskeletal:         General: No tenderness or deformity. Normal range of motion.      Cervical back: Normal range of motion and neck supple.      Lumbar back: Normal. No spasms.      Right lower leg: No edema.      Left lower leg: No edema.   Skin:     General: Skin is warm and dry.      Capillary Refill: Capillary refill takes less than 2 seconds.      Coloration: Skin is not jaundiced.      " Findings: No rash.   Neurological:      General: No focal deficit present.      Mental Status: She is alert and oriented to person, place, and time.      Cranial Nerves: No cranial nerve deficit.      Sensory: No sensory deficit.      Motor: No weakness.      Coordination: Coordination normal.      Gait: Gait normal.   Psychiatric:         Mood and Affect: Mood normal.         Behavior: Behavior normal. Behavior is cooperative.         Thought Content: Thought content normal.         Judgment: Judgment normal.           Assessment:       1. Encounter for medical examination to establish care    2. Anxiety    3. Psychophysiological insomnia    4. S/P bariatric surgery    5. Chronic fatigue    6. Abnormal finding of blood chemistry, unspecified    7. Other screening mammogram    8. Essential hypertension    9. Lumbar radiculopathy    10. Screening for diabetes mellitus    11. Class 3 severe obesity with serious comorbidity and body mass index (BMI) of 40.0 to 44.9 in adult, unspecified obesity type    12. Screening cholesterol level         Plan:       Encounter for medical examination to establish care    Anxiety  Discussed previous medication and discussed option of duloxetine which could be beneficial in more than one way for her. Can be useful in patients with chronic musculoskeletal pain as well as for anxiety and depression. Patient willing to try. Discussed potential side effects. VU  -     DULoxetine (CYMBALTA) 30 MG capsule; Take 1 capsule (30 mg total) by mouth once daily.  Dispense: 30 capsule; Refill: 11  -     TSH; Future; Expected date: 12/18/2023  -     T4, Free; Future; Expected date: 12/18/2023  -     ALPRAZolam (XANAX) 0.25 MG tablet; Take 1 tablet (0.25 mg total) by mouth nightly as needed for Anxiety or Insomnia.  Dispense: 90 tablet; Refill: 0    Psychophysiological insomnia  Strictly for prn use  -     ALPRAZolam (XANAX) 0.25 MG tablet; Take 1 tablet (0.25 mg total) by mouth nightly as needed for  Anxiety or Insomnia.  Dispense: 90 tablet; Refill: 0    S/P bariatric surgery  Labs for evaluation  -     CBC Auto Differential; Future; Expected date: 12/18/2023  -     Iron and TIBC; Future; Expected date: 12/18/2023  -     Ferritin; Future; Expected date: 12/18/2023  -     Comprehensive Metabolic Panel; Future; Expected date: 12/18/2023  -     Lipid Panel; Future; Expected date: 12/18/2023  -     Magnesium; Future; Expected date: 12/18/2023  -     TSH; Future; Expected date: 12/18/2023  -     T4, Free; Future; Expected date: 12/18/2023  -     Vitamin D; Future; Expected date: 12/18/2023  -     Vitamin B12; Future; Expected date: 12/18/2023  -     Folate; Future; Expected date: 12/18/2023    Chronic fatigue  -     CBC Auto Differential; Future; Expected date: 12/18/2023  -     Iron and TIBC; Future; Expected date: 12/18/2023  -     Ferritin; Future; Expected date: 12/18/2023  -     TSH; Future; Expected date: 12/18/2023  -     T4, Free; Future; Expected date: 12/18/2023  -     Vitamin D; Future; Expected date: 12/18/2023  -     Vitamin B12; Future; Expected date: 12/18/2023  -     Folate; Future; Expected date: 12/18/2023    Abnormal finding of blood chemistry, unspecified  -     Iron and TIBC; Future; Expected date: 12/18/2023  -     Ferritin; Future; Expected date: 12/18/2023  -     Comprehensive Metabolic Panel; Future; Expected date: 12/18/2023  -     Hemoglobin A1C; Future; Expected date: 12/18/2023  -     Magnesium; Future; Expected date: 12/18/2023  -     TSH; Future; Expected date: 12/18/2023  -     T4, Free; Future; Expected date: 12/18/2023  -     Vitamin D; Future; Expected date: 12/18/2023  -     Vitamin B12; Future; Expected date: 12/18/2023  -     Folate; Future; Expected date: 12/18/2023    Other screening mammogram  -     Mammo Digital Screening Bilat w/ Jason; Future; Expected date: 12/18/2023    Essential hypertension  Continue as is. Labs for evaluation  -     amLODIPine (NORVASC) 10 MG tablet;  Take 1 tablet (10 mg total) by mouth once daily.  Dispense: 90 tablet; Refill: 3  -     hydroCHLOROthiazide (HYDRODIURIL) 12.5 MG Tab; Take 1 tablet (12.5 mg total) by mouth once daily.  Dispense: 90 tablet; Refill: 3  -     CBC Auto Differential; Future; Expected date: 12/18/2023  -     Iron and TIBC; Future; Expected date: 12/18/2023  -     Ferritin; Future; Expected date: 12/18/2023  -     Comprehensive Metabolic Panel; Future; Expected date: 12/18/2023  -     Lipid Panel; Future; Expected date: 12/18/2023  -     Magnesium; Future; Expected date: 12/18/2023  -     Microalbumin/Creatinine Ratio, Urine; Future; Expected date: 12/18/2023  -     TSH; Future; Expected date: 12/18/2023  -     T4, Free; Future; Expected date: 12/18/2023  -     Microalbumin/Creatinine Ratio, Urine    Lumbar radiculopathy  Will try gabapentin for bedtime relief and if does not make too drowsy will use prn in the day. Hoping to get some added benefit from duloxetine, as well.  -     gabapentin (NEURONTIN) 100 MG capsule; Take 1 capsule (100 mg total) by mouth 3 (three) times daily as needed (nerve pain).  Dispense: 90 capsule; Refill: 3  -     DULoxetine (CYMBALTA) 30 MG capsule; Take 1 capsule (30 mg total) by mouth once daily.  Dispense: 30 capsule; Refill: 11    Screening for diabetes mellitus  -     Hemoglobin A1C; Future; Expected date: 12/18/2023    Class 3 severe obesity with serious comorbidity and body mass index (BMI) of 40.0 to 44.9 in adult, unspecified obesity type  -     CBC Auto Differential; Future; Expected date: 12/18/2023  -     Comprehensive Metabolic Panel; Future; Expected date: 12/18/2023  -     Hemoglobin A1C; Future; Expected date: 12/18/2023  -     Lipid Panel; Future; Expected date: 12/18/2023  -     TSH; Future; Expected date: 12/18/2023  -     T4, Free; Future; Expected date: 12/18/2023    Screening cholesterol level  -     Lipid Panel; Future; Expected date: 12/18/2023      Follow up in about 4 weeks (around  1/15/2024) for new medication.        1/1/2024 Juhi Ruiz

## 2023-12-18 NOTE — TELEPHONE ENCOUNTER
----- Message from Myah Guzman sent at 12/18/2023  4:53 PM CST -----  The patient saw Juhi today. She needs to be seen in 4 weeks late pm. Pt's # [phone]

## 2023-12-22 ENCOUNTER — TELEPHONE (OUTPATIENT)
Dept: FAMILY MEDICINE | Facility: CLINIC | Age: 43
End: 2023-12-22
Payer: COMMERCIAL

## 2023-12-22 ENCOUNTER — HOSPITAL ENCOUNTER (OUTPATIENT)
Dept: RADIOLOGY | Facility: HOSPITAL | Age: 43
Discharge: HOME | End: 2023-12-22
Payer: COMMERCIAL

## 2023-12-22 DIAGNOSIS — R92.8 ABNORMAL MAMMOGRAM: Primary | ICD-10-CM

## 2023-12-22 DIAGNOSIS — Z12.31 OTHER SCREENING MAMMOGRAM: ICD-10-CM

## 2023-12-22 LAB
25(OH)D3 SERPL-MCNC: 33 NG/ML (ref 30–100)
ALBUMIN SERPL-MCNC: 3.9 G/DL (ref 3.6–5.1)
ALBUMIN/CREAT UR: 2 MCG/MG CREAT
ALBUMIN/GLOB SERPL: 1.6 (CALC) (ref 1–2.5)
ALP SERPL-CCNC: 38 U/L (ref 31–125)
ALT SERPL-CCNC: 8 U/L (ref 6–29)
AST SERPL-CCNC: 9 U/L (ref 10–30)
BASOPHILS # BLD AUTO: 31 CELLS/UL (ref 0–200)
BASOPHILS NFR BLD AUTO: 0.8 %
BILIRUB SERPL-MCNC: 0.7 MG/DL (ref 0.2–1.2)
BUN SERPL-MCNC: 6 MG/DL (ref 7–25)
BUN/CREAT SERPL: 7 (CALC) (ref 6–22)
CALCIUM SERPL-MCNC: 9.2 MG/DL (ref 8.6–10.2)
CHLORIDE SERPL-SCNC: 104 MMOL/L (ref 98–110)
CHOLEST SERPL-MCNC: 171 MG/DL
CHOLEST/HDLC SERPL: 2.6 (CALC)
CO2 SERPL-SCNC: 28 MMOL/L (ref 20–32)
CREAT SERPL-MCNC: 0.9 MG/DL (ref 0.5–0.99)
CREAT UR-MCNC: 298 MG/DL (ref 20–275)
EGFR: 82 ML/MIN/1.73M2
EOSINOPHIL # BLD AUTO: 59 CELLS/UL (ref 15–500)
EOSINOPHIL NFR BLD AUTO: 1.5 %
ERYTHROCYTE [DISTWIDTH] IN BLOOD BY AUTOMATED COUNT: 11.9 % (ref 11–15)
FERRITIN SERPL-MCNC: 75 NG/ML (ref 16–232)
FOLATE SERPL-MCNC: 16.9 NG/ML
GLOBULIN SER CALC-MCNC: 2.5 G/DL (CALC) (ref 1.9–3.7)
GLUCOSE SERPL-MCNC: 90 MG/DL (ref 65–99)
HBA1C MFR BLD: 5 % OF TOTAL HGB
HCT VFR BLD AUTO: 38.9 % (ref 35–45)
HDLC SERPL-MCNC: 66 MG/DL
HGB BLD-MCNC: 13.2 G/DL (ref 11.7–15.5)
IRON SATN MFR SERPL: 47 % (CALC) (ref 16–45)
IRON SERPL-MCNC: 136 MCG/DL (ref 40–190)
LDLC SERPL CALC-MCNC: 91 MG/DL (CALC)
LYMPHOCYTES # BLD AUTO: 1884 CELLS/UL (ref 850–3900)
LYMPHOCYTES NFR BLD AUTO: 48.3 %
MAGNESIUM SERPL-MCNC: 2 MG/DL (ref 1.5–2.5)
MCH RBC QN AUTO: 30.3 PG (ref 27–33)
MCHC RBC AUTO-ENTMCNC: 33.9 G/DL (ref 32–36)
MCV RBC AUTO: 89.4 FL (ref 80–100)
MICROALBUMIN UR-MCNC: 0.6 MG/DL
MONOCYTES # BLD AUTO: 269 CELLS/UL (ref 200–950)
MONOCYTES NFR BLD AUTO: 6.9 %
NEUTROPHILS # BLD AUTO: 1658 CELLS/UL (ref 1500–7800)
NEUTROPHILS NFR BLD AUTO: 42.5 %
NONHDLC SERPL-MCNC: 105 MG/DL (CALC)
PLATELET # BLD AUTO: 304 THOUSAND/UL (ref 140–400)
PMV BLD REES-ECKER: 10.6 FL (ref 7.5–12.5)
POTASSIUM SERPL-SCNC: 4.1 MMOL/L (ref 3.5–5.3)
PROT SERPL-MCNC: 6.4 G/DL (ref 6.1–8.1)
RBC # BLD AUTO: 4.35 MILLION/UL (ref 3.8–5.1)
SODIUM SERPL-SCNC: 138 MMOL/L (ref 135–146)
T4 FREE SERPL-MCNC: 0.9 NG/DL (ref 0.8–1.8)
TIBC SERPL-MCNC: 287 MCG/DL (CALC) (ref 250–450)
TRIGL SERPL-MCNC: 55 MG/DL
TSH SERPL-ACNC: 0.38 MIU/L
VIT B12 SERPL-MCNC: 716 PG/ML (ref 200–1100)
WBC # BLD AUTO: 3.9 THOUSAND/UL (ref 3.8–10.8)

## 2023-12-22 PROCEDURE — 77067 SCR MAMMO BI INCL CAD: CPT | Mod: TC,PO

## 2023-12-22 NOTE — TELEPHONE ENCOUNTER
----- Message from ISELA Bernard-ENIO sent at 12/22/2023 12:15 PM CST -----  Please let Ms. Flower know that I have reviewed her mammogram results and the radiologist is recommending a more in depth look due to very dense breast tissue. I am going to order a diagnostic mammogram. Nothing to be concerned about at present.

## 2023-12-22 NOTE — PROGRESS NOTES
Please let Ms. Flower know that I have reviewed her mammogram results and the radiologist is recommending a more in depth look due to very dense breast tissue. I am going to order a diagnostic mammogram. Nothing to be concerned about at present.

## 2023-12-22 NOTE — TELEPHONE ENCOUNTER
Spoke with patient gave mammo results verbatim. Patient verbalized understanding. Will await call from Imaging center.

## 2023-12-26 ENCOUNTER — TELEPHONE (OUTPATIENT)
Dept: FAMILY MEDICINE | Facility: CLINIC | Age: 43
End: 2023-12-26
Payer: COMMERCIAL

## 2023-12-26 NOTE — TELEPHONE ENCOUNTER
----- Message from Monie Garibay MA sent at 12/26/2023 11:15 AM CST -----    ----- Message -----  From: Johnna Davis  Sent: 12/26/2023  11:14 AM CST  To: [facility] Staff    Pt was calling Angy back.  [phone]

## 2023-12-26 NOTE — TELEPHONE ENCOUNTER
Spoke with pt states she os working out a lot and taking pre workout. Pt states she will stop the pre work out for now.

## 2023-12-26 NOTE — TELEPHONE ENCOUNTER
From: Johnna Davis  Sent: 12/26/2023  11:14 AM CST  To: [facility] Staff     Pt was calling Angy back.  [phone]

## 2023-12-26 NOTE — TELEPHONE ENCOUNTER
Yes. I noticed. She also has high creatinine in her urine. Someone please reach out to her and find out if there is any possibility that she could be pregnant? Her complaints were fatigue etc and I just want to be sure because of some of the medications she is on.......

## 2023-12-26 NOTE — PROGRESS NOTES
Vitamin d is low end of normal, could use supplementation, 5,000 at least, otc  Folate and b12 are normal  Cholesterol is all good  Not diabetic  Iron and complete blood cell count are all normal  Electrolytes are good  Liver functions are normal  Is there any chance she could be pregnant?

## 2024-01-01 PROBLEM — F41.9 ANXIETY: Status: ACTIVE | Noted: 2024-01-01

## 2024-01-01 PROBLEM — E66.813 CLASS 3 SEVERE OBESITY WITH SERIOUS COMORBIDITY AND BODY MASS INDEX (BMI) OF 40.0 TO 44.9 IN ADULT: Status: ACTIVE | Noted: 2024-01-01

## 2024-01-01 PROBLEM — R53.82 CHRONIC FATIGUE: Status: ACTIVE | Noted: 2024-01-01

## 2024-01-01 PROBLEM — R79.9 ABNORMAL FINDING OF BLOOD CHEMISTRY, UNSPECIFIED: Status: ACTIVE | Noted: 2024-01-01

## 2024-01-01 PROBLEM — M54.16 LUMBAR RADICULOPATHY: Status: ACTIVE | Noted: 2024-01-01

## 2024-01-01 PROBLEM — I10 ESSENTIAL HYPERTENSION: Status: ACTIVE | Noted: 2024-01-01

## 2024-01-01 PROBLEM — F51.04 PSYCHOPHYSIOLOGICAL INSOMNIA: Status: ACTIVE | Noted: 2024-01-01

## 2024-01-01 PROBLEM — E66.01 CLASS 3 SEVERE OBESITY WITH SERIOUS COMORBIDITY AND BODY MASS INDEX (BMI) OF 40.0 TO 44.9 IN ADULT: Status: ACTIVE | Noted: 2024-01-01

## 2024-01-01 PROBLEM — Z98.84 S/P BARIATRIC SURGERY: Status: ACTIVE | Noted: 2024-01-01

## 2024-01-05 ENCOUNTER — PATIENT OUTREACH (OUTPATIENT)
Dept: ADMINISTRATIVE | Facility: HOSPITAL | Age: 44
End: 2024-01-05
Payer: COMMERCIAL

## 2024-01-05 NOTE — PROGRESS NOTES
Population Health Chart Review & Patient Outreach Details    Outreach Performed: NO    Additional Pop Health Notes:           Updates Requested / Reviewed:      Updated Care Coordination Note, External Sources: [facility] and [facility], Care Team Updated, and Immunizations Reconciliation Completed or Queried: [place]         Health Maintenance Topics Overdue:    Health Maintenance Due   Topic Date Due    Hepatitis C Screening  Never done    HIV Screening  Never done    TETANUS VACCINE  Never done    Cervical Cancer Screening  03/13/2023    Influenza Vaccine (1) 09/01/2023    COVID-19 Vaccine (2 - 2023-24 season) 09/01/2023         Health Maintenance Topic(s) Outreach Outcomes & Actions Taken:    Cervical Cancer Screening - Outreach Outcomes & Actions Taken  : External Records Uploaded & Care Team Updated if Applicable

## 2024-01-25 ENCOUNTER — TELEPHONE (OUTPATIENT)
Dept: FAMILY MEDICINE | Facility: CLINIC | Age: 44
End: 2024-01-25
Payer: COMMERCIAL

## 2024-01-25 ENCOUNTER — HOSPITAL ENCOUNTER (OUTPATIENT)
Dept: RADIOLOGY | Facility: HOSPITAL | Age: 44
Discharge: HOME | End: 2024-01-25
Payer: COMMERCIAL

## 2024-01-25 DIAGNOSIS — R92.8 ABNORMAL MAMMOGRAM: ICD-10-CM

## 2024-01-25 PROCEDURE — 76642 ULTRASOUND BREAST LIMITED: CPT | Mod: TC,PO,RT

## 2024-01-25 NOTE — TELEPHONE ENCOUNTER
Spoke to pt verbatim per Juhi. Pt voiced  understanding. States she is ok to wait the 6 months. Reminder created.

## 2024-01-25 NOTE — PROGRESS NOTES
Has appt with me in 4 days but let's not make her wait for results. Let her know the ultrasound agrees with the findings on the mammogram- there is a what looks like to the radiologist an enlarged lymph node. They feel it is benign in appearance and they recommend we do repeat imaging in 6 months, but if she wants to pursue other options now, we can send her to Dr. Douglas or the [facility] for another opinion.

## 2024-01-25 NOTE — TELEPHONE ENCOUNTER
----- Message from ISELA Bernard-CNP sent at 1/25/2024  4:22 PM CST -----  Has appt with me in 4 days but let's not make her wait for results. Let her know the ultrasound agrees with the findings on the mammogram- there is a what looks like to the radiologist an enlarged lymph node. They feel it is benign in appearance and they recommend we do repeat imaging in 6 months, but if she wants to pursue other options now, we can send her to Dr. Douglas or the [facility] for another opinion.

## 2024-01-29 ENCOUNTER — OFFICE VISIT (OUTPATIENT)
Dept: FAMILY MEDICINE | Facility: CLINIC | Age: 44
End: 2024-01-29
Payer: COMMERCIAL

## 2024-01-29 VITALS
WEIGHT: 265 LBS | HEIGHT: 67 IN | BODY MASS INDEX: 41.59 KG/M2 | DIASTOLIC BLOOD PRESSURE: 66 MMHG | OXYGEN SATURATION: 99 % | SYSTOLIC BLOOD PRESSURE: 110 MMHG | HEART RATE: 81 BPM

## 2024-01-29 DIAGNOSIS — F41.0 SEVERE ANXIETY WITH PANIC: Primary | ICD-10-CM

## 2024-01-29 DIAGNOSIS — F41.9 ANXIETY: ICD-10-CM

## 2024-01-29 DIAGNOSIS — R11.0 NAUSEA: ICD-10-CM

## 2024-01-29 DIAGNOSIS — M54.16 LUMBAR RADICULOPATHY: ICD-10-CM

## 2024-01-29 PROCEDURE — 99213 OFFICE O/P EST LOW 20 MIN: CPT | Mod: S$GLB,,,

## 2024-01-29 PROCEDURE — 3078F DIAST BP <80 MM HG: CPT | Mod: CPTII,S$GLB,,

## 2024-01-29 PROCEDURE — 3074F SYST BP LT 130 MM HG: CPT | Mod: CPTII,S$GLB,,

## 2024-01-29 PROCEDURE — 3008F BODY MASS INDEX DOCD: CPT | Mod: CPTII,S$GLB,,

## 2024-01-29 RX ORDER — ALPRAZOLAM 0.5 MG/1
TABLET ORAL
Qty: 60 TABLET | Refills: 0 | Status: SHIPPED | OUTPATIENT
Start: 2024-01-29 | End: 2024-04-30

## 2024-01-29 RX ORDER — ALPRAZOLAM 0.5 MG/1
TABLET ORAL
Qty: 60 TABLET | Refills: 0 | Status: SHIPPED | OUTPATIENT
Start: 2024-03-29 | End: 2024-04-30

## 2024-01-29 RX ORDER — BUSPIRONE HYDROCHLORIDE 5 MG/1
5 TABLET ORAL 2 TIMES DAILY PRN
Qty: 180 TABLET | Refills: 3 | Status: SHIPPED | OUTPATIENT
Start: 2024-01-29 | End: 2025-01-28

## 2024-01-29 RX ORDER — DULOXETIN HYDROCHLORIDE 60 MG/1
60 CAPSULE, DELAYED RELEASE ORAL DAILY
Qty: 90 CAPSULE | Refills: 3 | Status: SHIPPED | OUTPATIENT
Start: 2024-01-29 | End: 2024-04-30

## 2024-01-29 RX ORDER — ONDANSETRON 4 MG/1
4 TABLET, ORALLY DISINTEGRATING ORAL EVERY 8 HOURS PRN
Qty: 20 TABLET | Refills: 1 | Status: SHIPPED | OUTPATIENT
Start: 2024-01-29

## 2024-01-29 RX ORDER — ALPRAZOLAM 0.5 MG/1
TABLET ORAL
Qty: 60 TABLET | Refills: 0 | Status: SHIPPED | OUTPATIENT
Start: 2024-02-29 | End: 2024-04-30

## 2024-01-29 NOTE — PROGRESS NOTES
SUBJECTIVE:    Patient ID: Vanessa Flower is a 43 y.o. female.    Chief Complaint: Follow-up (No bottles//Pt here for 4 weeks follow//discuss mammo and joint pain//Had flu vacc at job//JL)    43 year old female here to   Follow-up from previous visit.   Where we did start medication for anxiety.  Still having symptoms.    Discussed combination therapy for anxiety and depression along with chronic musculoskeletal pain by using duloxetine.  Patient agrees with plan.  Add buspirone for anxiety.  Alprazolam for severe panic.        Follow-up  Associated symptoms include arthralgias. Pertinent negatives include no chest pain, headaches, joint swelling, neck pain, vomiting or weakness.       Patient Outreach on 01/05/2024   Component Date Value Ref Range Status    PAP Recommendation External 03/13/2018 Pap in 5 years   Final    Pap 03/13/2018 Negative for intraephithelial lesion or malignancy  Negative for intraephithelial lesion or malignancy, Other Final    HPV DNA 03/13/2018 None Detected  None Detected Final   Office Visit on 12/18/2023   Component Date Value Ref Range Status    WBC 12/21/2023 3.9  3.8 - 10.8 Thousand/uL Final    RBC 12/21/2023 4.35  3.80 - 5.10 Million/uL Final    Hemoglobin 12/21/2023 13.2  11.7 - 15.5 g/dL Final    Hematocrit 12/21/2023 38.9  35.0 - 45.0 % Final    MCV 12/21/2023 89.4  80.0 - 100.0 fL Final    MCH 12/21/2023 30.3  27.0 - 33.0 pg Final    MCHC 12/21/2023 33.9  32.0 - 36.0 g/dL Final    RDW 12/21/2023 11.9  11.0 - 15.0 % Final    Platelets 12/21/2023 304  140 - 400 Thousand/uL Final    MPV 12/21/2023 10.6  7.5 - 12.5 fL Final    Neutrophils, Abs 12/21/2023 1,658  1,500 - 7,800 cells/uL Final    Lymph # 12/21/2023 1,884  850 - 3,900 cells/uL Final    Mono # 12/21/2023 269  200 - 950 cells/uL Final    Eos # 12/21/2023 59  15 - 500 cells/uL Final    Baso # 12/21/2023 31  0 - 200 cells/uL Final    Neutrophils Relative 12/21/2023 42.5  % Final    Lymph % 12/21/2023 48.3  % Final     Mono % 12/21/2023 6.9  % Final    Eosinophil % 12/21/2023 1.5  % Final    Basophil % 12/21/2023 0.8  % Final    Iron 12/21/2023 136  40 - 190 mcg/dL Final    TIBC 12/21/2023 287  250 - 450 mcg/dL (calc) Final    Iron Saturation 12/21/2023 47 (H)  16 - 45 % (calc) Final    Ferritin 12/21/2023 75  16 - 232 ng/mL Final    Glucose 12/21/2023 90  65 - 99 mg/dL Final    BUN 12/21/2023 6 (L)  7 - 25 mg/dL Final    Creatinine 12/21/2023 0.90  0.50 - 0.99 mg/dL Final    eGFR 12/21/2023 82  > OR = 60 mL/min/1.73m2 Final    BUN/Creatinine Ratio 12/21/2023 7  6 - 22 (calc) Final    Sodium 12/21/2023 138  135 - 146 mmol/L Final    Potassium 12/21/2023 4.1  3.5 - 5.3 mmol/L Final    Chloride 12/21/2023 104  98 - 110 mmol/L Final    CO2 12/21/2023 28  20 - 32 mmol/L Final    Calcium 12/21/2023 9.2  8.6 - 10.2 mg/dL Final    Total Protein 12/21/2023 6.4  6.1 - 8.1 g/dL Final    Albumin 12/21/2023 3.9  3.6 - 5.1 g/dL Final    Globulin, Total 12/21/2023 2.5  1.9 - 3.7 g/dL (calc) Final    Albumin/Globulin Ratio 12/21/2023 1.6  1.0 - 2.5 (calc) Final    Total Bilirubin 12/21/2023 0.7  0.2 - 1.2 mg/dL Final    Alkaline Phosphatase 12/21/2023 38  31 - 125 U/L Final    AST 12/21/2023 9 (L)  10 - 30 U/L Final    ALT 12/21/2023 8  6 - 29 U/L Final    Hemoglobin A1C 12/21/2023 5.0  <5.7 % of total Hgb Final    Cholesterol 12/21/2023 171  <200 mg/dL Final    HDL 12/21/2023 66  > OR = 50 mg/dL Final    Triglycerides 12/21/2023 55  <150 mg/dL Final    LDL Cholesterol 12/21/2023 91  mg/dL (calc) Final    HDL/Cholesterol Ratio 12/21/2023 2.6  <5.0 (calc) Final    Non HDL Chol. (LDL+VLDL) 12/21/2023 105  <130 mg/dL (calc) Final    Magnesium 12/21/2023 2.0  1.5 - 2.5 mg/dL Final    TSH 12/21/2023 0.38 (L)  mIU/L Final    T4, Free 12/21/2023 0.9  0.8 - 1.8 ng/dL Final    Vitamin D, 25-OH, Total 12/21/2023 33  30 - 100 ng/mL Final    Vitamin B-12 12/21/2023 716  200 - 1,100 pg/mL Final    Folate 12/21/2023 16.9  ng/mL Final    Creatinine, Urine  12/21/2023 298 (H)  20 - 275 mg/dL Final    Microalb, Ur 12/21/2023 0.6  See Note: mg/dL Final    Microalb/Creat Ratio 12/21/2023 2  <30 mcg/mg creat Final       Past Medical History:   Diagnosis Date    Hypertension     Obese     Panic attack     Sciatica      Social History     Socioeconomic History    Marital status: Single   Tobacco Use    Smoking status: Never   Substance and Sexual Activity    Alcohol use: Not Currently    Drug use: Never     Social Determinants of Health     Financial Resource Strain: Low Risk  (1/29/2024)    Overall Financial Resource Strain (CARDIA)     Difficulty of Paying Living Expenses: Not hard at all   Food Insecurity: Food Insecurity Present (1/29/2024)    Hunger Vital Sign     Worried About Running Out of Food in the Last Year: Sometimes true     Ran Out of Food in the Last Year: Sometimes true   Transportation Needs: No Transportation Needs (1/29/2024)    PRAPARE - Transportation     Lack of Transportation (Medical): No     Lack of Transportation (Non-Medical): No   Physical Activity: Insufficiently Active (1/29/2024)    Exercise Vital Sign     Days of Exercise per Week: 2 days     Minutes of Exercise per Session: 30 min   Stress: Stress Concern Present (1/29/2024)    [employer] - Occupational Stress Questionnaire     Feeling of Stress : Very much   Social Connections: Unknown (1/29/2024)    Social Connection and Isolation Panel [NHANES]     Frequency of Communication with Friends and Family: More than three times a week     Frequency of Social Gatherings with Friends and Family: Once a week     Active Member of Clubs or Organizations: No     Attends Club or Organization Meetings: Never     Marital Status: Never    Housing Stability: Low Risk  (1/29/2024)    Housing Stability Vital Sign     Unable to Pay for Housing in the Last Year: No     Number of Places Lived in the Last Year: 1     Unstable Housing in the Last Year: No     Past Surgical  History:   Procedure Laterality Date    ANKLE FRACTURE SURGERY       SECTION      CHOLECYSTECTOMY      FEMUR FRACTURE SURGERY      gastric sleeve  2023    PATELLA FRACTURE SURGERY       History reviewed. No pertinent family history.    Review of patient's allergies indicates:  No Known Allergies    Current Outpatient Medications:     amLODIPine (NORVASC) 10 MG tablet, Take 1 tablet (10 mg total) by mouth once daily., Disp: 90 tablet, Rfl: 3    ascorbic acid, vitamin C, (VITAMIN C) 1000 MG tablet, Take 1,000 mg by mouth once daily., Disp: , Rfl:     b complex vitamins capsule, Take 1 capsule by mouth once daily., Disp: , Rfl:     calcium-vitamin D3 (OS-ISSA 500 + D3) 500 mg-5 mcg (200 unit) per tablet, Take 1 tablet by mouth 2 (two) times daily with meals., Disp: , Rfl:     docusate sodium (COLACE) 100 MG capsule, Take 100 mg by mouth 2 (two) times daily as needed for Constipation., Disp: , Rfl:     gabapentin (NEURONTIN) 100 MG capsule, Take 1 capsule (100 mg total) by mouth 3 (three) times daily as needed (nerve pain)., Disp: 90 capsule, Rfl: 3    hydroCHLOROthiazide (HYDRODIURIL) 12.5 MG Tab, Take 1 tablet (12.5 mg total) by mouth once daily., Disp: 90 tablet, Rfl: 3    multivit with iron,minerals (FLINTSTONES COMPLETE, IRON, ORAL), Take by mouth Daily., Disp: , Rfl:     albuterol (VENTOLIN HFA) 90 mcg/actuation inhaler, Inhale 1-2 puffs into the lungs every 6 (six) hours as needed for Wheezing or Shortness of Breath. Rescue, Disp: 8 g, Rfl: 0    ALPRAZolam (XANAX) 0.5 MG tablet, Take a half tablet to one whole tablet twice a day as needed for acute panic or anxiety., Disp: 60 tablet, Rfl: 0    [START ON 2024] ALPRAZolam (XANAX) 0.5 MG tablet, Take a half a tablet to one tablet twice a day as needed for acute panic or anxiety, Disp: 60 tablet, Rfl: 0    [START ON 3/29/2024] ALPRAZolam (XANAX) 0.5 MG tablet, Take one half tablet to one tablet twice a day as needed for acute panic or anxiety, Disp:  "60 tablet, Rfl: 0    amoxicillin-clavulanate 875-125mg (AUGMENTIN) 875-125 mg per tablet, Take 1 tablet by mouth every 12 (twelve) hours. for 10 days, Disp: 20 tablet, Rfl: 0    busPIRone (BUSPAR) 5 MG Tab, Take 1 tablet (5 mg total) by mouth 2 (two) times daily as needed (anxiety)., Disp: 180 tablet, Rfl: 3    DULoxetine (CYMBALTA) 60 MG capsule, Take 1 capsule (60 mg total) by mouth once daily., Disp: 90 capsule, Rfl: 3    ondansetron (ZOFRAN-ODT) 4 MG TbDL, Take 1 tablet (4 mg total) by mouth every 8 (eight) hours as needed (nausea)., Disp: 20 tablet, Rfl: 1    promethazine-dextromethorphan (PROMETHAZINE-DM) 6.25-15 mg/5 mL Syrp, Take 5 mLs by mouth every 8 (eight) hours as needed (cough)., Disp: 240 mL, Rfl: 0    Review of Systems   Constitutional:  Negative for activity change and unexpected weight change.   HENT:  Negative for hearing loss, rhinorrhea and trouble swallowing.    Eyes:  Negative for discharge and visual disturbance.   Respiratory:  Negative for chest tightness and wheezing.    Cardiovascular:  Negative for chest pain and palpitations.   Gastrointestinal:  Negative for blood in stool, constipation, diarrhea and vomiting.   Endocrine: Negative for polydipsia and polyuria.   Genitourinary:  Negative for difficulty urinating, dysuria, hematuria and menstrual problem.   Musculoskeletal:  Positive for arthralgias. Negative for joint swelling and neck pain.   Neurological:  Negative for weakness and headaches.   Psychiatric/Behavioral:  Positive for sleep disturbance. Negative for confusion and dysphoric mood. The patient is nervous/anxious.            Objective:      Vitals:    01/29/24 1427   BP: 110/66   Pulse: 81   SpO2: 99%   Weight: 120.2 kg (265 lb)   Height: 5' 7" (1.702 m)     Physical Exam  Constitutional:       General: She is not in acute distress.     Appearance: She is obese.   HENT:      Head: Normocephalic and atraumatic.      Nose: Nose normal.      Mouth/Throat:      Pharynx: " Oropharynx is clear.   Eyes:      Pupils: Pupils are equal, round, and reactive to light.   Neck:      Vascular: No carotid bruit.   Cardiovascular:      Rate and Rhythm: Normal rate and regular rhythm.      Pulses: Normal pulses.      Heart sounds: Normal heart sounds.   Pulmonary:      Effort: Pulmonary effort is normal.      Breath sounds: Normal breath sounds.   Abdominal:      Palpations: Abdomen is soft.   Musculoskeletal:      Right lower leg: No edema.      Left lower leg: No edema.   Skin:     General: Skin is warm and dry.      Capillary Refill: Capillary refill takes less than 2 seconds.   Neurological:      General: No focal deficit present.      Mental Status: She is alert.   Psychiatric:         Mood and Affect: Mood normal.           Assessment:       1. Severe anxiety with panic    2. Nausea    3. Anxiety    4. Lumbar radiculopathy         Plan:       Severe anxiety with panic   Add for severe anxiety, panic attacks.  -     ALPRAZolam (XANAX) 0.5 MG tablet; Take a half tablet to one whole tablet twice a day as needed for acute panic or anxiety.  Dispense: 60 tablet; Refill: 0  -     ALPRAZolam (XANAX) 0.5 MG tablet; Take a half a tablet to one tablet twice a day as needed for acute panic or anxiety  Dispense: 60 tablet; Refill: 0  -     ALPRAZolam (XANAX) 0.5 MG tablet; Take one half tablet to one tablet twice a day as needed for acute panic or anxiety  Dispense: 60 tablet; Refill: 0    Nausea  -     ondansetron (ZOFRAN-ODT) 4 MG TbDL; Take 1 tablet (4 mg total) by mouth every 8 (eight) hours as needed (nausea).  Dispense: 20 tablet; Refill: 1    Anxiety    Adding buspirone for anxiety to be managed along with duloxetine.  -     busPIRone (BUSPAR) 5 MG Tab; Take 1 tablet (5 mg total) by mouth 2 (two) times daily as needed (anxiety).  Dispense: 180 tablet; Refill: 3    Lumbar radiculopathy    Increase duloxetine to 60 mg.  -     DULoxetine (CYMBALTA) 60 MG capsule; Take 1 capsule (60 mg total) by  mouth once daily.  Dispense: 90 capsule; Refill: 3      Follow up in about 6 weeks (around 3/11/2024).        2/21/2024 Juhi Ruiz

## 2024-02-14 ENCOUNTER — OFFICE VISIT (OUTPATIENT)
Dept: FAMILY MEDICINE | Facility: CLINIC | Age: 44
End: 2024-02-14
Payer: COMMERCIAL

## 2024-02-14 ENCOUNTER — TELEPHONE (OUTPATIENT)
Dept: FAMILY MEDICINE | Facility: CLINIC | Age: 44
End: 2024-02-14

## 2024-02-14 VITALS
HEART RATE: 81 BPM | BODY MASS INDEX: 43.49 KG/M2 | HEIGHT: 65 IN | SYSTOLIC BLOOD PRESSURE: 110 MMHG | OXYGEN SATURATION: 98 % | DIASTOLIC BLOOD PRESSURE: 68 MMHG | WEIGHT: 261 LBS | TEMPERATURE: 99 F

## 2024-02-14 DIAGNOSIS — J06.9 UPPER RESPIRATORY TRACT INFECTION, UNSPECIFIED TYPE: Primary | ICD-10-CM

## 2024-02-14 DIAGNOSIS — J03.90 TONSILLITIS: ICD-10-CM

## 2024-02-14 DIAGNOSIS — R05.1 ACUTE COUGH: ICD-10-CM

## 2024-02-14 LAB
CTP QC/QA: YES
POC MOLECULAR INFLUENZA A AGN: NEGATIVE
POC MOLECULAR INFLUENZA B AGN: NEGATIVE

## 2024-02-14 PROCEDURE — 87502 INFLUENZA DNA AMP PROBE: CPT | Mod: QW,,,

## 2024-02-14 PROCEDURE — 1159F MED LIST DOCD IN RCRD: CPT | Mod: CPTII,S$GLB,,

## 2024-02-14 PROCEDURE — 3074F SYST BP LT 130 MM HG: CPT | Mod: CPTII,S$GLB,,

## 2024-02-14 PROCEDURE — 3078F DIAST BP <80 MM HG: CPT | Mod: CPTII,S$GLB,,

## 2024-02-14 PROCEDURE — 3008F BODY MASS INDEX DOCD: CPT | Mod: CPTII,S$GLB,,

## 2024-02-14 PROCEDURE — 99213 OFFICE O/P EST LOW 20 MIN: CPT | Mod: S$GLB,,,

## 2024-02-14 RX ORDER — AMOXICILLIN AND CLAVULANATE POTASSIUM 875; 125 MG/1; MG/1
1 TABLET, FILM COATED ORAL EVERY 12 HOURS
Qty: 20 TABLET | Refills: 0 | Status: SHIPPED | OUTPATIENT
Start: 2024-02-14 | End: 2024-02-24

## 2024-02-14 RX ORDER — ALBUTEROL SULFATE 90 UG/1
1-2 AEROSOL, METERED RESPIRATORY (INHALATION) EVERY 6 HOURS PRN
Qty: 8 G | Refills: 0 | Status: SHIPPED | OUTPATIENT
Start: 2024-02-14 | End: 2025-02-13

## 2024-02-14 RX ORDER — PROMETHAZINE HYDROCHLORIDE AND DEXTROMETHORPHAN HYDROBROMIDE 6.25; 15 MG/5ML; MG/5ML
5 SYRUP ORAL EVERY 8 HOURS PRN
Qty: 240 ML | Refills: 0 | Status: SHIPPED | OUTPATIENT
Start: 2024-02-14 | End: 2024-04-30

## 2024-02-14 NOTE — PROGRESS NOTES
"  SUBJECTIVE:    Patient ID: Vanessa Flower is a 43 y.o. female.    Chief Complaint: Cough and Nasal Congestion (No bottles//Pt here for cough and chest congestion since this weekend. Some throat irritation. Denies fever, SOB. Taking mucinex. Tested for covid yesterday, was negative//JL)    43 year old established patient presents today with "cold symptoms." Symptoms started Saturday, runny nose, cough, sneezing. Moved into chest.  Has been taking mucinex multi symptom. Helps some. Now also has sore throat. No fever, but does get "chills and aches at night." Tested for Covid at home yesterday, was negative. Flu negative here today. Educated patient that Covid may still be possibility and if she wants to retest in a few days she can. If positive, let me know. Patient knows how to take universal precautions and is wearing a mask today.   States she has not been nauseated but has had so much "mucus" that she did vomit some due to this. No diarrhea. No appetite really either. Advised to hydrate.       Cough  Associated symptoms include chills, headaches, myalgias, rhinorrhea, a sore throat and wheezing.       Patient Outreach on 01/05/2024   Component Date Value Ref Range Status    PAP Recommendation External 03/13/2018 Pap in 5 years   Final    Pap 03/13/2018 Negative for intraephithelial lesion or malignancy  Negative for intraephithelial lesion or malignancy, Other Final    HPV DNA 03/13/2018 None Detected  None Detected Final   Office Visit on 12/18/2023   Component Date Value Ref Range Status    WBC 12/21/2023 3.9  3.8 - 10.8 Thousand/uL Final    RBC 12/21/2023 4.35  3.80 - 5.10 Million/uL Final    Hemoglobin 12/21/2023 13.2  11.7 - 15.5 g/dL Final    Hematocrit 12/21/2023 38.9  35.0 - 45.0 % Final    MCV 12/21/2023 89.4  80.0 - 100.0 fL Final    MCH 12/21/2023 30.3  27.0 - 33.0 pg Final    MCHC 12/21/2023 33.9  32.0 - 36.0 g/dL Final    RDW 12/21/2023 11.9  11.0 - 15.0 % Final    Platelets 12/21/2023 304  140 " - 400 Thousand/uL Final    MPV 12/21/2023 10.6  7.5 - 12.5 fL Final    Neutrophils, Abs 12/21/2023 1,658  1,500 - 7,800 cells/uL Final    Lymph # 12/21/2023 1,884  850 - 3,900 cells/uL Final    Mono # 12/21/2023 269  200 - 950 cells/uL Final    Eos # 12/21/2023 59  15 - 500 cells/uL Final    Baso # 12/21/2023 31  0 - 200 cells/uL Final    Neutrophils Relative 12/21/2023 42.5  % Final    Lymph % 12/21/2023 48.3  % Final    Mono % 12/21/2023 6.9  % Final    Eosinophil % 12/21/2023 1.5  % Final    Basophil % 12/21/2023 0.8  % Final    Iron 12/21/2023 136  40 - 190 mcg/dL Final    TIBC 12/21/2023 287  250 - 450 mcg/dL (calc) Final    Iron Saturation 12/21/2023 47 (H)  16 - 45 % (calc) Final    Ferritin 12/21/2023 75  16 - 232 ng/mL Final    Glucose 12/21/2023 90  65 - 99 mg/dL Final    BUN 12/21/2023 6 (L)  7 - 25 mg/dL Final    Creatinine 12/21/2023 0.90  0.50 - 0.99 mg/dL Final    eGFR 12/21/2023 82  > OR = 60 mL/min/1.73m2 Final    BUN/Creatinine Ratio 12/21/2023 7  6 - 22 (calc) Final    Sodium 12/21/2023 138  135 - 146 mmol/L Final    Potassium 12/21/2023 4.1  3.5 - 5.3 mmol/L Final    Chloride 12/21/2023 104  98 - 110 mmol/L Final    CO2 12/21/2023 28  20 - 32 mmol/L Final    Calcium 12/21/2023 9.2  8.6 - 10.2 mg/dL Final    Total Protein 12/21/2023 6.4  6.1 - 8.1 g/dL Final    Albumin 12/21/2023 3.9  3.6 - 5.1 g/dL Final    Globulin, Total 12/21/2023 2.5  1.9 - 3.7 g/dL (calc) Final    Albumin/Globulin Ratio 12/21/2023 1.6  1.0 - 2.5 (calc) Final    Total Bilirubin 12/21/2023 0.7  0.2 - 1.2 mg/dL Final    Alkaline Phosphatase 12/21/2023 38  31 - 125 U/L Final    AST 12/21/2023 9 (L)  10 - 30 U/L Final    ALT 12/21/2023 8  6 - 29 U/L Final    Hemoglobin A1C 12/21/2023 5.0  <5.7 % of total Hgb Final    Cholesterol 12/21/2023 171  <200 mg/dL Final    HDL 12/21/2023 66  > OR = 50 mg/dL Final    Triglycerides 12/21/2023 55  <150 mg/dL Final    LDL Cholesterol 12/21/2023 91  mg/dL (calc) Final    HDL/Cholesterol Ratio  12/21/2023 2.6  <5.0 (calc) Final    Non HDL Chol. (LDL+VLDL) 12/21/2023 105  <130 mg/dL (calc) Final    Magnesium 12/21/2023 2.0  1.5 - 2.5 mg/dL Final    TSH 12/21/2023 0.38 (L)  mIU/L Final    T4, Free 12/21/2023 0.9  0.8 - 1.8 ng/dL Final    Vitamin D, 25-OH, Total 12/21/2023 33  30 - 100 ng/mL Final    Vitamin B-12 12/21/2023 716  200 - 1,100 pg/mL Final    Folate 12/21/2023 16.9  ng/mL Final    Creatinine, Urine 12/21/2023 298 (H)  20 - 275 mg/dL Final    Microalb, Ur 12/21/2023 0.6  See Note: mg/dL Final    Microalb/Creat Ratio 12/21/2023 2  <30 mcg/mg creat Final       Past Medical History:   Diagnosis Date    Hypertension     Obese     Panic attack     Sciatica      Social History     Socioeconomic History    Marital status: Single   Tobacco Use    Smoking status: Never   Substance and Sexual Activity    Alcohol use: Not Currently    Drug use: Never     Social Determinants of Health     Financial Resource Strain: Low Risk  (1/29/2024)    Overall Financial Resource Strain (CARDIA)     Difficulty of Paying Living Expenses: Not hard at all   Food Insecurity: Food Insecurity Present (1/29/2024)    Hunger Vital Sign     Worried About Running Out of Food in the Last Year: Sometimes true     Ran Out of Food in the Last Year: Sometimes true   Transportation Needs: No Transportation Needs (1/29/2024)    PRAPARE - Transportation     Lack of Transportation (Medical): No     Lack of Transportation (Non-Medical): No   Physical Activity: Insufficiently Active (1/29/2024)    Exercise Vital Sign     Days of Exercise per Week: 2 days     Minutes of Exercise per Session: 30 min   Stress: Stress Concern Present (1/29/2024)    [employer] - Occupational Stress Questionnaire     Feeling of Stress : Very much   Social Connections: Unknown (1/29/2024)    Social Connection and Isolation Panel [NHANES]     Frequency of Communication with Friends and Family: More than three times a week     Frequency of  Social Gatherings with Friends and Family: Once a week     Active Member of Clubs or Organizations: No     Attends Club or Organization Meetings: Never     Marital Status: Never    Housing Stability: Low Risk  (2024)    Housing Stability Vital Sign     Unable to Pay for Housing in the Last Year: No     Number of Places Lived in the Last Year: 1     Unstable Housing in the Last Year: No     Past Surgical History:   Procedure Laterality Date    ANKLE FRACTURE SURGERY       SECTION      CHOLECYSTECTOMY      FEMUR FRACTURE SURGERY      gastric sleeve  2023    PATELLA FRACTURE SURGERY       History reviewed. No pertinent family history.    Review of patient's allergies indicates:  No Known Allergies    Current Outpatient Medications:     ALPRAZolam (XANAX) 0.5 MG tablet, Take a half tablet to one whole tablet twice a day as needed for acute panic or anxiety., Disp: 60 tablet, Rfl: 0    amLODIPine (NORVASC) 10 MG tablet, Take 1 tablet (10 mg total) by mouth once daily., Disp: 90 tablet, Rfl: 3    ascorbic acid, vitamin C, (VITAMIN C) 1000 MG tablet, Take 1,000 mg by mouth once daily., Disp: , Rfl:     b complex vitamins capsule, Take 1 capsule by mouth once daily., Disp: , Rfl:     busPIRone (BUSPAR) 5 MG Tab, Take 1 tablet (5 mg total) by mouth 2 (two) times daily as needed (anxiety)., Disp: 180 tablet, Rfl: 3    calcium-vitamin D3 (OS-ISSA 500 + D3) 500 mg-5 mcg (200 unit) per tablet, Take 1 tablet by mouth 2 (two) times daily with meals., Disp: , Rfl:     docusate sodium (COLACE) 100 MG capsule, Take 100 mg by mouth 2 (two) times daily as needed for Constipation., Disp: , Rfl:     DULoxetine (CYMBALTA) 60 MG capsule, Take 1 capsule (60 mg total) by mouth once daily., Disp: 90 capsule, Rfl: 3    gabapentin (NEURONTIN) 100 MG capsule, Take 1 capsule (100 mg total) by mouth 3 (three) times daily as needed (nerve pain)., Disp: 90 capsule, Rfl: 3    hydroCHLOROthiazide (HYDRODIURIL) 12.5 MG Tab,  "Take 1 tablet (12.5 mg total) by mouth once daily., Disp: 90 tablet, Rfl: 3    multivit with iron,minerals (FLINTSTONES COMPLETE, IRON, ORAL), Take by mouth Daily., Disp: , Rfl:     ondansetron (ZOFRAN-ODT) 4 MG TbDL, Take 1 tablet (4 mg total) by mouth every 8 (eight) hours as needed (nausea)., Disp: 20 tablet, Rfl: 1    albuterol (VENTOLIN HFA) 90 mcg/actuation inhaler, Inhale 1-2 puffs into the lungs every 6 (six) hours as needed for Wheezing or Shortness of Breath. Rescue, Disp: 8 g, Rfl: 0    [START ON 2/29/2024] ALPRAZolam (XANAX) 0.5 MG tablet, Take a half a tablet to one tablet twice a day as needed for acute panic or anxiety, Disp: 60 tablet, Rfl: 0    [START ON 3/29/2024] ALPRAZolam (XANAX) 0.5 MG tablet, Take one half tablet to one tablet twice a day as needed for acute panic or anxiety, Disp: 60 tablet, Rfl: 0    amoxicillin-clavulanate 875-125mg (AUGMENTIN) 875-125 mg per tablet, Take 1 tablet by mouth every 12 (twelve) hours. for 10 days, Disp: 20 tablet, Rfl: 0    promethazine-dextromethorphan (PROMETHAZINE-DM) 6.25-15 mg/5 mL Syrp, Take 5 mLs by mouth every 8 (eight) hours as needed (cough)., Disp: 240 mL, Rfl: 0    Review of Systems   Constitutional:  Positive for appetite change and chills.   HENT:  Positive for nasal congestion, rhinorrhea, sinus pressure/congestion, sneezing and sore throat.    Respiratory:  Positive for cough and wheezing.    Gastrointestinal:  Positive for vomiting. Negative for nausea.   Musculoskeletal:  Positive for myalgias.   Neurological:  Positive for headaches.           Objective:      Vitals:    02/14/24 0954   BP: 110/68   Pulse: 81   Temp: 98.7 °F (37.1 °C)   TempSrc: Oral   SpO2: 98%   Weight: 118.4 kg (261 lb)   Height: 5' 5" (1.651 m)     Physical Exam  Constitutional:       General: She is not in acute distress.     Appearance: She is ill-appearing.   HENT:      Head: Normocephalic and atraumatic.      Right Ear: Tympanic membrane and ear canal normal.      Left " "Ear: Tympanic membrane and ear canal normal.      Nose:      Right Turbinates: Enlarged.      Left Turbinates: Enlarged.      Right Sinus: Maxillary sinus tenderness and frontal sinus tenderness present.      Left Sinus: Maxillary sinus tenderness and frontal sinus tenderness present.      Mouth/Throat:      Lips: Pink. No lesions.      Mouth: Mucous membranes are dry.      Tongue: No lesions.      Pharynx: Posterior oropharyngeal erythema present. No oropharyngeal exudate.      Tonsils: No tonsillar exudate or tonsillar abscesses. 1+ on the right. 3+ on the left.     Eyes:      General: No scleral icterus.        Right eye: No discharge.         Left eye: No discharge.   Cardiovascular:      Rate and Rhythm: Normal rate and regular rhythm.      Pulses: Normal pulses.      Heart sounds: Normal heart sounds.   Pulmonary:      Effort: Pulmonary effort is normal.      Breath sounds: Normal breath sounds. No wheezing or rhonchi.   Abdominal:      Palpations: Abdomen is soft.      Tenderness: There is no abdominal tenderness.   Musculoskeletal:      Right lower leg: No edema.      Left lower leg: No edema.   Lymphadenopathy:      Cervical: No cervical adenopathy.   Skin:     Capillary Refill: Capillary refill takes less than 2 seconds.      Coloration: Skin is not pale.      Findings: No rash.   Neurological:      General: No focal deficit present.      Mental Status: She is alert.   Psychiatric:         Mood and Affect: Mood normal.           Assessment:       1. Upper respiratory tract infection, unspecified type    2. Acute cough    3. Tonsillitis         Plan:       Upper respiratory tract infection, unspecified type  Covid negative at home yesterday. This was day 4 of symptoms, so could be accurate. Flu negative today. Did not strep. No petechiae. No exudate. Some erythema and tonsillar enlargement. Says grandchild at home has a "cold" too. Reports feeling chest "congestion and tightness, momo at night." PCN to cover " strep just in case, albuterol for chest tightness.   -     amoxicillin-clavulanate 875-125mg (AUGMENTIN) 875-125 mg per tablet; Take 1 tablet by mouth every 12 (twelve) hours. for 10 days  Dispense: 20 tablet; Refill: 0  -     albuterol (VENTOLIN HFA) 90 mcg/actuation inhaler; Inhale 1-2 puffs into the lungs every 6 (six) hours as needed for Wheezing or Shortness of Breath. Rescue  Dispense: 8 g; Refill: 0    Acute cough  Take cough syrup at bedtime for rest.   -     POCT Influenza A/B Molecular  -     promethazine-dextromethorphan (PROMETHAZINE-DM) 6.25-15 mg/5 mL Syrp; Take 5 mLs by mouth every 8 (eight) hours as needed (cough).  Dispense: 240 mL; Refill: 0    Tonsillitis  -     amoxicillin-clavulanate 875-125mg (AUGMENTIN) 875-125 mg per tablet; Take 1 tablet by mouth every 12 (twelve) hours. for 10 days  Dispense: 20 tablet; Refill: 0      Follow up if symptoms worsen or fail to improve.        2/14/2024 Juhi Ruiz

## 2024-02-14 NOTE — PATIENT INSTRUCTIONS
Keep taking Mucinex  Lots of fluids, warm/hot liquids will help sore throat and help break up mucus  Warm compress to face for sinus congestion  Mentholated vapors like Vicks rub or something similar to chest for that feeling of congestion.   Tylenol/ibuprofen for fever/aches  Eat some yogurt or take a probiotic while taking antibiotics to avoid side effects like yeast infection/diarrhea

## 2024-02-21 PROBLEM — F41.0 SEVERE ANXIETY WITH PANIC: Status: ACTIVE | Noted: 2024-02-21

## 2024-04-30 ENCOUNTER — OFFICE VISIT (OUTPATIENT)
Dept: FAMILY MEDICINE | Facility: CLINIC | Age: 44
End: 2024-04-30
Payer: COMMERCIAL

## 2024-04-30 VITALS
HEART RATE: 92 BPM | SYSTOLIC BLOOD PRESSURE: 100 MMHG | WEIGHT: 158 LBS | HEIGHT: 65 IN | OXYGEN SATURATION: 99 % | DIASTOLIC BLOOD PRESSURE: 68 MMHG | BODY MASS INDEX: 26.33 KG/M2

## 2024-04-30 DIAGNOSIS — R25.2 LEG CRAMPING: Primary | ICD-10-CM

## 2024-04-30 DIAGNOSIS — I10 ESSENTIAL HYPERTENSION: ICD-10-CM

## 2024-04-30 DIAGNOSIS — F41.0 SEVERE ANXIETY WITH PANIC: ICD-10-CM

## 2024-04-30 PROCEDURE — 99214 OFFICE O/P EST MOD 30 MIN: CPT | Mod: S$GLB,,,

## 2024-04-30 PROCEDURE — 1160F RVW MEDS BY RX/DR IN RCRD: CPT | Mod: CPTII,S$GLB,,

## 2024-04-30 PROCEDURE — 3074F SYST BP LT 130 MM HG: CPT | Mod: CPTII,S$GLB,,

## 2024-04-30 PROCEDURE — 3008F BODY MASS INDEX DOCD: CPT | Mod: CPTII,S$GLB,,

## 2024-04-30 PROCEDURE — 1159F MED LIST DOCD IN RCRD: CPT | Mod: CPTII,S$GLB,,

## 2024-04-30 PROCEDURE — 3078F DIAST BP <80 MM HG: CPT | Mod: CPTII,S$GLB,,

## 2024-04-30 RX ORDER — VITS A,C,E/LUTEIN/MINERALS 300MCG-200
1 TABLET ORAL NIGHTLY
Qty: 90 TABLET | Refills: 3 | Status: SHIPPED | OUTPATIENT
Start: 2024-04-30

## 2024-04-30 RX ORDER — ALPRAZOLAM 0.5 MG/1
TABLET ORAL
Qty: 60 TABLET | Refills: 0 | Status: SHIPPED | OUTPATIENT
Start: 2024-04-30

## 2024-04-30 RX ORDER — ALPRAZOLAM 0.5 MG/1
TABLET ORAL
Qty: 60 TABLET | Refills: 0 | Status: SHIPPED | OUTPATIENT
Start: 2024-05-30 | End: 2024-06-05 | Stop reason: SDUPTHER

## 2024-04-30 RX ORDER — ALPRAZOLAM 0.5 MG/1
TABLET ORAL
Qty: 60 TABLET | Refills: 0 | Status: SHIPPED | OUTPATIENT
Start: 2024-06-30

## 2024-04-30 NOTE — PATIENT INSTRUCTIONS
Call me in 3 months and remind me if you need refills of Xanax and if not in 3 months, whenever you do.

## 2024-05-05 PROBLEM — R25.2 LEG CRAMPING: Status: ACTIVE | Noted: 2024-05-05

## 2024-05-06 NOTE — PROGRESS NOTES
SUBJECTIVE:    Patient ID: Vanessa Flower is a 43 y.o. female.    Chief Complaint: Follow-up (No bottles//Pt here for 3 mo follow up//discuss cymbalta. Pt states she cannot take it, makes her too groggy and dizzy. Pt states she has a throbbing in her legs, states its not painful. Using voltaren and biofreeze//discuss migraines//JL)    43 year old established female presents today for follow up.    Still having pain in her legs and does not like the way duloxetine made her feel. States it made her too groggy. She weaned herself off. States her legs just feel tired and crampy after she works, but not as bad as they used to.    Doing well with anxiety.    Sleeping better.    Hepatitis C Screening Never done  TETANUS VACCINE Never done  Mammogram due on 12/22/2024  Lipid Panel Completed     Follow-up  Associated symptoms include headaches. Pertinent negatives include no arthralgias, chest pain, joint swelling, neck pain, vomiting or weakness.       Office Visit on 02/14/2024   Component Date Value Ref Range Status    POC Molecular Influenza A Ag 02/14/2024 Negative  Negative, Not Reported Final    POC Molecular Influenza B Ag 02/14/2024 Negative  Negative, Not Reported Final     Acceptable 02/14/2024 Yes   Final   Patient Outreach on 01/05/2024   Component Date Value Ref Range Status    PAP Recommendation External 03/13/2018 Pap in 5 years   Final    Pap 03/13/2018 Negative for intraephithelial lesion or malignancy  Negative for intraephithelial lesion or malignancy, Other Final    HPV DNA 03/13/2018 None Detected  None Detected Final   Office Visit on 12/18/2023   Component Date Value Ref Range Status    WBC 12/21/2023 3.9  3.8 - 10.8 Thousand/uL Final    RBC 12/21/2023 4.35  3.80 - 5.10 Million/uL Final    Hemoglobin 12/21/2023 13.2  11.7 - 15.5 g/dL Final    Hematocrit 12/21/2023 38.9  35.0 - 45.0 % Final    MCV 12/21/2023 89.4  80.0 - 100.0 fL Final    MCH 12/21/2023 30.3  27.0 - 33.0 pg Final     MCHC 12/21/2023 33.9  32.0 - 36.0 g/dL Final    RDW 12/21/2023 11.9  11.0 - 15.0 % Final    Platelets 12/21/2023 304  140 - 400 Thousand/uL Final    MPV 12/21/2023 10.6  7.5 - 12.5 fL Final    Neutrophils, Abs 12/21/2023 1,658  1,500 - 7,800 cells/uL Final    Lymph # 12/21/2023 1,884  850 - 3,900 cells/uL Final    Mono # 12/21/2023 269  200 - 950 cells/uL Final    Eos # 12/21/2023 59  15 - 500 cells/uL Final    Baso # 12/21/2023 31  0 - 200 cells/uL Final    Neutrophils Relative 12/21/2023 42.5  % Final    Lymph % 12/21/2023 48.3  % Final    Mono % 12/21/2023 6.9  % Final    Eosinophil % 12/21/2023 1.5  % Final    Basophil % 12/21/2023 0.8  % Final    Iron 12/21/2023 136  40 - 190 mcg/dL Final    TIBC 12/21/2023 287  250 - 450 mcg/dL (calc) Final    Iron Saturation 12/21/2023 47 (H)  16 - 45 % (calc) Final    Ferritin 12/21/2023 75  16 - 232 ng/mL Final    Glucose 12/21/2023 90  65 - 99 mg/dL Final    BUN 12/21/2023 6 (L)  7 - 25 mg/dL Final    Creatinine 12/21/2023 0.90  0.50 - 0.99 mg/dL Final    eGFR 12/21/2023 82  > OR = 60 mL/min/1.73m2 Final    BUN/Creatinine Ratio 12/21/2023 7  6 - 22 (calc) Final    Sodium 12/21/2023 138  135 - 146 mmol/L Final    Potassium 12/21/2023 4.1  3.5 - 5.3 mmol/L Final    Chloride 12/21/2023 104  98 - 110 mmol/L Final    CO2 12/21/2023 28  20 - 32 mmol/L Final    Calcium 12/21/2023 9.2  8.6 - 10.2 mg/dL Final    Total Protein 12/21/2023 6.4  6.1 - 8.1 g/dL Final    Albumin 12/21/2023 3.9  3.6 - 5.1 g/dL Final    Globulin, Total 12/21/2023 2.5  1.9 - 3.7 g/dL (calc) Final    Albumin/Globulin Ratio 12/21/2023 1.6  1.0 - 2.5 (calc) Final    Total Bilirubin 12/21/2023 0.7  0.2 - 1.2 mg/dL Final    Alkaline Phosphatase 12/21/2023 38  31 - 125 U/L Final    AST 12/21/2023 9 (L)  10 - 30 U/L Final    ALT 12/21/2023 8  6 - 29 U/L Final    Hemoglobin A1C 12/21/2023 5.0  <5.7 % of total Hgb Final    Cholesterol 12/21/2023 171  <200 mg/dL Final    HDL 12/21/2023 66  > OR = 50 mg/dL Final     Triglycerides 12/21/2023 55  <150 mg/dL Final    LDL Cholesterol 12/21/2023 91  mg/dL (calc) Final    HDL/Cholesterol Ratio 12/21/2023 2.6  <5.0 (calc) Final    Non HDL Chol. (LDL+VLDL) 12/21/2023 105  <130 mg/dL (calc) Final    Magnesium 12/21/2023 2.0  1.5 - 2.5 mg/dL Final    TSH 12/21/2023 0.38 (L)  mIU/L Final    T4, Free 12/21/2023 0.9  0.8 - 1.8 ng/dL Final    Vitamin D, 25-OH, Total 12/21/2023 33  30 - 100 ng/mL Final    Vitamin B-12 12/21/2023 716  200 - 1,100 pg/mL Final    Folate 12/21/2023 16.9  ng/mL Final    Creatinine, Urine 12/21/2023 298 (H)  20 - 275 mg/dL Final    Microalb, Ur 12/21/2023 0.6  See Note: mg/dL Final    Microalb/Creat Ratio 12/21/2023 2  <30 mcg/mg creat Final       Past Medical History:   Diagnosis Date    Hypertension     Obese     Panic attack     Sciatica      Social History     Socioeconomic History    Marital status: Single   Tobacco Use    Smoking status: Never   Substance and Sexual Activity    Alcohol use: Not Currently    Drug use: Never     Social Determinants of Health     Financial Resource Strain: Low Risk  (1/29/2024)    Overall Financial Resource Strain (CARDIA)     Difficulty of Paying Living Expenses: Not hard at all   Food Insecurity: Food Insecurity Present (1/29/2024)    Hunger Vital Sign     Worried About Running Out of Food in the Last Year: Sometimes true     Ran Out of Food in the Last Year: Sometimes true   Transportation Needs: No Transportation Needs (1/29/2024)    PRAPARE - Transportation     Lack of Transportation (Medical): No     Lack of Transportation (Non-Medical): No   Physical Activity: Insufficiently Active (1/29/2024)    Exercise Vital Sign     Days of Exercise per Week: 2 days     Minutes of Exercise per Session: 30 min   Stress: Stress Concern Present (1/29/2024)    [place] [employer] - Occupational Stress Questionnaire     Feeling of Stress : Very much   Housing Stability: Low Risk  (1/29/2024)    Housing Stability Vital  Sign     Unable to Pay for Housing in the Last Year: No     Number of Places Lived in the Last Year: 1     Unstable Housing in the Last Year: No     Past Surgical History:   Procedure Laterality Date    ANKLE FRACTURE SURGERY       SECTION      CHOLECYSTECTOMY      FEMUR FRACTURE SURGERY      gastric sleeve  2023    PATELLA FRACTURE SURGERY       No family history on file.    Review of patient's allergies indicates:  No Known Allergies    Current Outpatient Medications:     albuterol (VENTOLIN HFA) 90 mcg/actuation inhaler, Inhale 1-2 puffs into the lungs every 6 (six) hours as needed for Wheezing or Shortness of Breath. Rescue, Disp: 8 g, Rfl: 0    amLODIPine (NORVASC) 10 MG tablet, Take 1 tablet (10 mg total) by mouth once daily., Disp: 90 tablet, Rfl: 3    ascorbic acid, vitamin C, (VITAMIN C) 1000 MG tablet, Take 1,000 mg by mouth once daily., Disp: , Rfl:     b complex vitamins capsule, Take 1 capsule by mouth once daily., Disp: , Rfl:     busPIRone (BUSPAR) 5 MG Tab, Take 1 tablet (5 mg total) by mouth 2 (two) times daily as needed (anxiety)., Disp: 180 tablet, Rfl: 3    calcium-vitamin D3 (OS-ISSA 500 + D3) 500 mg-5 mcg (200 unit) per tablet, Take 1 tablet by mouth 2 (two) times daily with meals., Disp: , Rfl:     docusate sodium (COLACE) 100 MG capsule, Take 100 mg by mouth 2 (two) times daily as needed for Constipation., Disp: , Rfl:     gabapentin (NEURONTIN) 100 MG capsule, Take 1 capsule (100 mg total) by mouth 3 (three) times daily as needed (nerve pain)., Disp: 90 capsule, Rfl: 3    hydroCHLOROthiazide (HYDRODIURIL) 12.5 MG Tab, Take 1 tablet (12.5 mg total) by mouth once daily., Disp: 90 tablet, Rfl: 3    multivit with iron,minerals (FLINTSTONES COMPLETE, IRON, ORAL), Take by mouth Daily., Disp: , Rfl:     ondansetron (ZOFRAN-ODT) 4 MG TbDL, Take 1 tablet (4 mg total) by mouth every 8 (eight) hours as needed (nausea)., Disp: 20 tablet, Rfl: 1    [START ON 2024] ALPRAZolam (XANAX) 0.5  "MG tablet, Take a half tablet to one whole tablet twice a day as needed for acute panic or anxiety., Disp: 60 tablet, Rfl: 0    [START ON 6/30/2024] ALPRAZolam (XANAX) 0.5 MG tablet, Take one half tablet to one tablet twice a day as needed for acute panic or anxiety, Disp: 60 tablet, Rfl: 0    ALPRAZolam (XANAX) 0.5 MG tablet, Take a half a tablet to one tablet twice a day as needed for acute panic or anxiety, Disp: 60 tablet, Rfl: 0    magnesium oxide 200 mg magnesium Tab, Take 1 tablet by mouth every evening., Disp: 90 tablet, Rfl: 3    Review of Systems   Constitutional:  Negative for activity change and unexpected weight change.   HENT:  Negative for hearing loss, rhinorrhea and trouble swallowing.    Eyes:  Negative for discharge and visual disturbance.   Respiratory:  Negative for chest tightness and wheezing.    Cardiovascular:  Negative for chest pain and palpitations.   Gastrointestinal:  Negative for blood in stool, constipation, diarrhea and vomiting.   Endocrine: Negative for polydipsia and polyuria.   Genitourinary:  Negative for difficulty urinating, dysuria, hematuria and menstrual problem.   Musculoskeletal:  Negative for arthralgias, joint swelling and neck pain.   Neurological:  Positive for headaches. Negative for weakness.   Psychiatric/Behavioral:  Negative for confusion and dysphoric mood.            Objective:      Vitals:    04/30/24 1424   BP: 100/68   Pulse: 92   SpO2: 99%   Weight: 71.7 kg (158 lb)   Height: 5' 5" (1.651 m)     Physical Exam  Constitutional:       General: She is not in acute distress.     Appearance: She is obese.   HENT:      Head: Normocephalic and atraumatic.      Nose: Nose normal.      Mouth/Throat:      Pharynx: Oropharynx is clear.   Eyes:      Pupils: Pupils are equal, round, and reactive to light.   Neck:      Vascular: No carotid bruit.   Cardiovascular:      Rate and Rhythm: Normal rate and regular rhythm.      Pulses: Normal pulses.      Heart sounds: Normal " heart sounds.   Pulmonary:      Effort: Pulmonary effort is normal.      Breath sounds: Normal breath sounds.   Abdominal:      Palpations: Abdomen is soft.   Musculoskeletal:      Right lower leg: No edema.      Left lower leg: No edema.   Skin:     General: Skin is warm and dry.      Capillary Refill: Capillary refill takes less than 2 seconds.   Neurological:      General: No focal deficit present.      Mental Status: She is alert.   Psychiatric:         Mood and Affect: Mood normal.           Assessment:       1. Leg cramping    2. Severe anxiety with panic    3. Essential hypertension         Plan:       Leg cramping  Comments:  advised try magnesium at bedtime for leg cramping.  Orders:  -     Basic Metabolic Panel; Future; Expected date: 04/30/2024  -     Magnesium; Future; Expected date: 04/30/2024  -     magnesium oxide 200 mg magnesium Tab; Take 1 tablet by mouth every evening.  Dispense: 90 tablet; Refill: 3    Severe anxiety with panic  Comments:  refills of xanax for prn use  Orders:  -     ALPRAZolam (XANAX) 0.5 MG tablet; Take a half tablet to one whole tablet twice a day as needed for acute panic or anxiety.  Dispense: 60 tablet; Refill: 0  -     ALPRAZolam (XANAX) 0.5 MG tablet; Take one half tablet to one tablet twice a day as needed for acute panic or anxiety  Dispense: 60 tablet; Refill: 0  -     ALPRAZolam (XANAX) 0.5 MG tablet; Take a half a tablet to one tablet twice a day as needed for acute panic or anxiety  Dispense: 60 tablet; Refill: 0    Essential hypertension  Comments:  well controlled. continue as is.      Follow up in about 6 months (around 10/30/2024) for HTN; anxiety.        5/5/2024 Juhi Ruiz

## 2024-06-05 DIAGNOSIS — F41.0 SEVERE ANXIETY WITH PANIC: ICD-10-CM

## 2024-06-05 RX ORDER — ALPRAZOLAM 0.5 MG/1
TABLET ORAL
Qty: 60 TABLET | Refills: 0 | Status: SHIPPED | OUTPATIENT
Start: 2024-06-05

## 2024-07-04 DIAGNOSIS — F41.0 SEVERE ANXIETY WITH PANIC: ICD-10-CM

## 2024-07-05 RX ORDER — ALPRAZOLAM 0.5 MG/1
TABLET ORAL
Qty: 60 TABLET | Refills: 0 | Status: SHIPPED | OUTPATIENT
Start: 2024-07-08

## 2024-07-05 NOTE — TELEPHONE ENCOUNTER
Last OV 4/30/2024  Upcoming OV 10/30/2024    Per  last dispensed 06/09/2024     Rx order set up.

## 2024-08-09 DIAGNOSIS — F41.0 SEVERE ANXIETY WITH PANIC: ICD-10-CM

## 2024-08-09 RX ORDER — ALPRAZOLAM 0.5 MG/1
TABLET ORAL
Qty: 60 TABLET | Refills: 2 | Status: SHIPPED | OUTPATIENT
Start: 2024-08-09

## 2024-08-16 ENCOUNTER — HOSPITAL ENCOUNTER (OUTPATIENT)
Dept: RADIOLOGY | Facility: HOSPITAL | Age: 44
Discharge: HOME | End: 2024-08-16
Payer: COMMERCIAL

## 2024-08-16 DIAGNOSIS — R92.8 ABNORMAL MAMMOGRAM: ICD-10-CM

## 2024-08-16 PROCEDURE — 76642 ULTRASOUND BREAST LIMITED: CPT | Mod: TC,PO,RT

## 2024-08-16 PROCEDURE — 76642 ULTRASOUND BREAST LIMITED: CPT | Mod: 26,RT,, | Performed by: RADIOLOGY

## 2024-08-17 DIAGNOSIS — Z12.31 OTHER SCREENING MAMMOGRAM: Primary | ICD-10-CM

## 2024-08-17 NOTE — PROGRESS NOTES
Let patient know I reviewed ultrasound results. I know the radiologist spoke with her. Everything looks stable right now. I ordered her regular mammogram for December when it is due.

## 2024-08-19 ENCOUNTER — TELEPHONE (OUTPATIENT)
Dept: FAMILY MEDICINE | Facility: CLINIC | Age: 44
End: 2024-08-19
Payer: COMMERCIAL

## 2024-08-19 NOTE — TELEPHONE ENCOUNTER
----- Message from ZACHERY Bernard sent at 8/17/2024  9:40 AM CDT -----  Let patient know I reviewed ultrasound results. I know the radiologist spoke with her. Everything looks stable right now. I ordered her regular mammogram for December when it is due.

## 2024-09-25 DIAGNOSIS — F41.0 SEVERE ANXIETY WITH PANIC: ICD-10-CM

## 2024-09-25 RX ORDER — ALPRAZOLAM 0.5 MG/1
TABLET ORAL
Qty: 60 TABLET | Refills: 2 | Status: CANCELLED | OUTPATIENT
Start: 2024-09-25

## 2024-10-08 ENCOUNTER — PATIENT MESSAGE (OUTPATIENT)
Dept: FAMILY MEDICINE | Facility: CLINIC | Age: 44
End: 2024-10-08
Payer: COMMERCIAL

## 2024-10-28 ENCOUNTER — TELEPHONE (OUTPATIENT)
Dept: FAMILY MEDICINE | Facility: CLINIC | Age: 44
End: 2024-10-28
Payer: COMMERCIAL

## 2024-11-04 ENCOUNTER — OFFICE VISIT (OUTPATIENT)
Dept: FAMILY MEDICINE | Facility: CLINIC | Age: 44
End: 2024-11-04
Payer: COMMERCIAL

## 2024-11-04 VITALS
OXYGEN SATURATION: 100 % | DIASTOLIC BLOOD PRESSURE: 68 MMHG | HEIGHT: 65 IN | BODY MASS INDEX: 42.49 KG/M2 | WEIGHT: 255 LBS | HEART RATE: 80 BPM | SYSTOLIC BLOOD PRESSURE: 96 MMHG

## 2024-11-04 DIAGNOSIS — R25.2 LEG CRAMPING: ICD-10-CM

## 2024-11-04 DIAGNOSIS — R11.0 NAUSEA: ICD-10-CM

## 2024-11-04 DIAGNOSIS — K59.00 CONSTIPATION, UNSPECIFIED CONSTIPATION TYPE: ICD-10-CM

## 2024-11-04 DIAGNOSIS — I10 ESSENTIAL HYPERTENSION: Primary | ICD-10-CM

## 2024-11-04 DIAGNOSIS — M54.16 LUMBAR RADICULOPATHY: ICD-10-CM

## 2024-11-04 DIAGNOSIS — F41.0 SEVERE ANXIETY WITH PANIC: ICD-10-CM

## 2024-11-04 PROCEDURE — 1159F MED LIST DOCD IN RCRD: CPT | Mod: CPTII,S$GLB,,

## 2024-11-04 PROCEDURE — 3078F DIAST BP <80 MM HG: CPT | Mod: CPTII,S$GLB,,

## 2024-11-04 PROCEDURE — 99214 OFFICE O/P EST MOD 30 MIN: CPT | Mod: S$GLB,,,

## 2024-11-04 PROCEDURE — 3008F BODY MASS INDEX DOCD: CPT | Mod: CPTII,S$GLB,,

## 2024-11-04 PROCEDURE — 3074F SYST BP LT 130 MM HG: CPT | Mod: CPTII,S$GLB,,

## 2024-11-04 PROCEDURE — 1160F RVW MEDS BY RX/DR IN RCRD: CPT | Mod: CPTII,S$GLB,,

## 2024-11-04 RX ORDER — GABAPENTIN 100 MG/1
100 CAPSULE ORAL 3 TIMES DAILY PRN
Qty: 90 CAPSULE | Refills: 3 | Status: SHIPPED | OUTPATIENT
Start: 2024-11-04 | End: 2025-11-04

## 2024-11-04 RX ORDER — AMLODIPINE BESYLATE 10 MG/1
10 TABLET ORAL DAILY
Qty: 90 TABLET | Refills: 3 | Status: SHIPPED | OUTPATIENT
Start: 2024-11-04 | End: 2025-11-04

## 2024-11-04 RX ORDER — ONDANSETRON 4 MG/1
4 TABLET, ORALLY DISINTEGRATING ORAL EVERY 8 HOURS PRN
Qty: 20 TABLET | Refills: 1 | Status: SHIPPED | OUTPATIENT
Start: 2024-11-04

## 2024-11-04 RX ORDER — VITS A,C,E/LUTEIN/MINERALS 300MCG-200
2 TABLET ORAL NIGHTLY
Start: 2024-11-04

## 2024-11-04 RX ORDER — HYDROCHLOROTHIAZIDE 12.5 MG/1
12.5 TABLET ORAL DAILY
Qty: 90 TABLET | Refills: 3 | Status: SHIPPED | OUTPATIENT
Start: 2024-11-04 | End: 2025-11-04

## 2024-11-04 NOTE — PROGRESS NOTES
"  SUBJECTIVE:    Patient ID: Vanessa Flower is a 44 y.o. female.    Chief Complaint: Follow-up (No bottles//Pt here for 6 mo follow up//discuss constipation. Has tried doculax, miralax. Taking colace everyday with no relief//will make an appt for pap//had flu vacc from job 2 weeks ago//JL)    Follow-up  Pertinent negatives include no arthralgias, chest pain, headaches, joint swelling, neck pain, vomiting or weakness.     History of Present Illness    CHIEF COMPLAINT:  Vanessa presents today for follow-up and medication refills.    GASTROINTESTINAL:  She reports ongoing constipation, experiencing up to four days without a bowel movement. She has difficulty passing hard, dry stools and feels incomplete evacuation. Current management includes Miralax, Dulcolax, a generic stool softener, and fiber supplements, but these have been ineffective. She denies hemorrhoids or blood in stool.    MEDICATIONS:  Current medications include blood pressure medication, Zofran for nausea, gabapentin for neuropathy, and anxiety medication. She takes magnesium 200mg at bedtime, which she's been advised to increase to 400mg. She also takes calcium and Vitamin D supplements.    MENTAL HEALTH:  She reports improved anxiety overall, describing it as "good" recently. She experiences occasional overthinking and continues to have work-related stress.    SLEEP:  She reports adequate sleep without significant disturbances.    GENITOURINARY:  She denies frequent nighttime urination or any urinary concerns.    WEIGHT MANAGEMENT:  Her current weight is 255 lbs, down from 261 lbs in February. She notes experiencing weight fluctuations, having previously reached 248 lbs.    VACCINATIONS:  She received both flu vaccine and COVID vaccine, with the flu vaccine administered approximately two weeks ago. She denies any significant side effects from the vaccinations.      ROS:  General: -fever, -chills, -fatigue, -weight gain, -weight loss  Eyes: -vision " changes, -redness, -discharge  ENT: -ear pain, -nasal congestion, -sore throat  Cardiovascular: -chest pain, -palpitations, -lower extremity edema  Respiratory: -cough, -shortness of breath  Gastrointestinal: -abdominal pain, -nausea, -vomiting, -diarrhea, +constipation, -blood in stool, -hemorrhoids  Genitourinary: -dysuria, -hematuria, -frequency, -nocturia  Musculoskeletal: -joint pain, -muscle pain  Skin: -rash, -lesion  Neurological: -headache, -dizziness, -numbness, -tingling  Psychiatric: +anxiety, -depression, -sleep difficulty  Allergic: -allergic reactions         No visits with results within 6 Month(s) from this visit.   Latest known visit with results is:   Office Visit on 02/14/2024   Component Date Value Ref Range Status    POC Molecular Influenza A Ag 02/14/2024 Negative  Negative, Not Reported Final    POC Molecular Influenza B Ag 02/14/2024 Negative  Negative, Not Reported Final     Acceptable 02/14/2024 Yes   Final       Past Medical History:   Diagnosis Date    Hypertension     Obese     Panic attack     Sciatica      Social History     Socioeconomic History    Marital status: Single   Tobacco Use    Smoking status: Never   Substance and Sexual Activity    Alcohol use: Not Currently    Drug use: Never     Social Drivers of Health     Financial Resource Strain: Low Risk  (1/29/2024)    Overall Financial Resource Strain (CARDIA)     Difficulty of Paying Living Expenses: Not hard at all   Food Insecurity: Food Insecurity Present (1/29/2024)    Hunger Vital Sign     Worried About Running Out of Food in the Last Year: Sometimes true     Ran Out of Food in the Last Year: Sometimes true   Transportation Needs: No Transportation Needs (1/29/2024)    PRAPARE - Transportation     Lack of Transportation (Medical): No     Lack of Transportation (Non-Medical): No   Physical Activity: Insufficiently Active (1/29/2024)    Exercise Vital Sign     Days of Exercise per Week: 2 days     Minutes of  Exercise per Session: 30 min   Stress: Stress Concern Present (2024)    [employer] - Occupational Stress Questionnaire     Feeling of Stress : Very much   Housing Stability: Low Risk  (2024)    Housing Stability Vital Sign     Unable to Pay for Housing in the Last Year: No     Number of Places Lived in the Last Year: 1     Unstable Housing in the Last Year: No     Past Surgical History:   Procedure Laterality Date    ANKLE FRACTURE SURGERY       SECTION      CHOLECYSTECTOMY      FEMUR FRACTURE SURGERY      gastric sleeve  2023    PATELLA FRACTURE SURGERY       No family history on file.    The 10-year CVD risk score (Michelle, et al., 2008) is: 1.3%    Values used to calculate the score:      Age: 44 years      Sex: Female      Diabetic: No      Tobacco smoker: No      Systolic Blood Pressure: 96 mmHg      Is BP treated: Yes      HDL Cholesterol: 66 mg/dL      Total Cholesterol: 171 mg/dL    Tests to Keep You Healthy    Mammogram: Met on 2023  Cervical Cancer Screening: DUE  Last Blood Pressure <= 139/89 (2024): Yes      Review of patient's allergies indicates:  No Known Allergies    Current Outpatient Medications:     ALPRAZolam (XANAX) 0.5 MG tablet, Take a half tablet to one whole tablet twice a day as needed for acute panic or anxiety., Disp: 60 tablet, Rfl: 2    ascorbic acid, vitamin C, (VITAMIN C) 1000 MG tablet, Take 1,000 mg by mouth once daily., Disp: , Rfl:     b complex vitamins capsule, Take 1 capsule by mouth once daily., Disp: , Rfl:     busPIRone (BUSPAR) 5 MG Tab, Take 1 tablet (5 mg total) by mouth 2 (two) times daily as needed (anxiety)., Disp: 180 tablet, Rfl: 3    calcium-vitamin D3 (OS-ISSA 500 + D3) 500 mg-5 mcg (200 unit) per tablet, Take 1 tablet by mouth 2 (two) times daily with meals., Disp: , Rfl:     docusate sodium (COLACE) 100 MG capsule, Take 100 mg by mouth 2 (two) times daily as needed for Constipation., Disp: , Rfl:  "    multivit with iron,minerals (FLINTSTONES COMPLETE, IRON, ORAL), Take by mouth Daily., Disp: , Rfl:     amLODIPine (NORVASC) 10 MG tablet, Take 1 tablet (10 mg total) by mouth once daily., Disp: 90 tablet, Rfl: 3    gabapentin (NEURONTIN) 100 MG capsule, Take 1 capsule (100 mg total) by mouth 3 (three) times daily as needed (nerve pain)., Disp: 90 capsule, Rfl: 3    hydroCHLOROthiazide (HYDRODIURIL) 12.5 MG Tab, Take 1 tablet (12.5 mg total) by mouth once daily., Disp: 90 tablet, Rfl: 3    magnesium oxide 200 mg magnesium Tab, Take 2 tablets by mouth every evening., Disp: , Rfl:     ondansetron (ZOFRAN-ODT) 4 MG TbDL, Take 1 tablet (4 mg total) by mouth every 8 (eight) hours as needed (nausea)., Disp: 20 tablet, Rfl: 1    Review of Systems   Constitutional:  Negative for activity change and unexpected weight change.   HENT:  Negative for hearing loss, rhinorrhea and trouble swallowing.    Eyes:  Negative for discharge and visual disturbance.   Respiratory:  Negative for chest tightness and wheezing.    Cardiovascular:  Negative for chest pain and palpitations.   Gastrointestinal:  Positive for constipation. Negative for blood in stool, diarrhea and vomiting.   Endocrine: Negative for polydipsia and polyuria.   Genitourinary:  Negative for difficulty urinating, dysuria, hematuria and menstrual problem.   Musculoskeletal:  Negative for arthralgias, joint swelling and neck pain.   Neurological:  Negative for weakness and headaches.   Psychiatric/Behavioral:  Negative for confusion and dysphoric mood.            Objective:      Vitals:    11/04/24 0840   BP: 96/68   Pulse: 80   SpO2: 100%   Weight: 115.7 kg (255 lb)   Height: 5' 5" (1.651 m)     Physical Exam  Constitutional:       General: She is not in acute distress.     Appearance: She is obese.   HENT:      Head: Normocephalic and atraumatic.      Nose: Nose normal.      Mouth/Throat:      Pharynx: Oropharynx is clear.   Eyes:      Pupils: Pupils are equal, " round, and reactive to light.   Neck:      Vascular: No carotid bruit.   Cardiovascular:      Rate and Rhythm: Normal rate and regular rhythm.      Pulses: Normal pulses.      Heart sounds: Normal heart sounds.   Pulmonary:      Effort: Pulmonary effort is normal.      Breath sounds: Normal breath sounds.   Abdominal:      Palpations: Abdomen is soft.   Musculoskeletal:      Right lower leg: No edema.      Left lower leg: No edema.   Skin:     General: Skin is warm and dry.      Capillary Refill: Capillary refill takes less than 2 seconds.   Neurological:      General: No focal deficit present.      Mental Status: She is alert.   Psychiatric:         Mood and Affect: Mood normal.               Assessment:       1. Essential hypertension    2. Severe anxiety with panic    3. Lumbar radiculopathy    4. Leg cramping    5. Nausea    6. Constipation, unspecified constipation type         Plan:           Assessment & Plan    Assessed patient's constipation, likely exacerbated by gabapentin and calcium supplements  Considered options for chronic constipation management, including lactulose, or Amitiza or Linzess.  Evaluated anxiety symptoms, noting improvement with current management    Essential hypertension  Blood pressure goals discussed with patient.  Tolerating current medications.  BP controlled today.  Continue current management.  -     hydroCHLOROthiazide (HYDRODIURIL) 12.5 MG Tab; Take 1 tablet (12.5 mg total) by mouth once daily.  Dispense: 90 tablet; Refill: 3  -     amLODIPine (NORVASC) 10 MG tablet; Take 1 tablet (10 mg total) by mouth once daily.  Dispense: 90 tablet; Refill: 3    Severe anxiety with panic  Labs for evaluation of health/monitoring of condition.   Have discussed with patient about their prescriptions for narcotics and/or anxiolytics  We have discussed the cause of their symptoms and how to approach treatment  I have offered referrals to appropriate specialists including, but not limited to,  pain specialists, psychiatric referrals or psychology referrals  We have discussed the addictive nature of these drugs and the potential for adverse effects including, but not limited to, overdose and withdrawal  We have discussed the role that these medications play in their overall health  We have discussed the indications for using these medications and what the goal of the therapy is.  We have discussed the need to avoid escalation of therapy and the goal to minimize or hopefully eliminate the need for these drugs in the future  We have discussed the risks of having these medications in the home  All questions have been answered   -     DRUG MONITOR, PANEL 4, W/CONF, URINE; Future; Expected date: 11/04/2024    Lumbar radiculopathy  Discussed medication's mechanism of action, side effects, and contraindications.   NEUROPATHY:  - Refilled gabapentin for neuropathy.  -     gabapentin (NEURONTIN) 100 MG capsule; Take 1 capsule (100 mg total) by mouth 3 (three) times daily as needed (nerve pain).  Dispense: 90 capsule; Refill: 3    Leg cramping  Increase to 400mg nightly  -     magnesium oxide 200 mg magnesium Tab; Take 2 tablets by mouth every evening.    Nausea  NAUSEA:  - Refilled zofran for nausea as needed.  -     ondansetron (ZOFRAN-ODT) 4 MG TbDL; Take 1 tablet (4 mg total) by mouth every 8 (eight) hours as needed (nausea).  Dispense: 20 tablet; Refill: 1      Constipation  CONSTIPATION:  - Explained the constipating effects of calcium supplements.  - Provided information on the initial effects and adjustment period of chronic constipation medications.  - Vanessa to increase water intake to approximately 80 ounces per day.  - Increased magnesium supplement from 200mg to 400mg at bedtime.  - Increased docusate sodium (Colace) to 200mg twice daily (two 100mg tablets in the morning and two at bedtime) OR  - Start Pericolace (docusate calcium) once or twice daily as an alternative to regular  [place].                  FOLLOW UP:  - Follow up in 6 months.         Follow up in about 6 months (around 5/4/2025), or if symptoms worsen or fail to improve, for HTN.        This note was generated with the assistance of ambient listening technology. Verbal consent was obtained by the patient and accompanying visitor(s) for the recording of patient appointment to facilitate this note. I attest to having reviewed and edited the generated note for accuracy, though some syntax or spelling errors may persist. Please contact the author of this note for any clarification.      11/4/2024 Juhi Ruiz

## 2024-11-04 NOTE — PATIENT INSTRUCTIONS
Constipation Counseling  contributing factors to constipation: not enough water, low fiber diet and lack of exercise.  Recommend daily exercise as tolerated, adequate water intake (eight 8-oz glasses of water daily), and high fiber diet. OTC fiber supplements are recommended if diet does not reach daily fiber goal (20-30 grams daily), such as Metamucil, Citrucel, or FiberCon (take as directed, separate from other oral medications by >2 hours). Fiber can be taken in pill form, chewable form, or powder form, based on your preference.  Stool softeners can be used for prevention of constipation but will not typically relieve acute constipation alone. Stool softeners include docusate sodium (Colace), Sennakot, and Miralax.  Laxatives can be used to relieve acute constipation. Stimulant laxatives should not be used daily. This can cause your digestive system to become dependent upon these medications to produce a bowel movement. Laxatives that contain stimulants include Dulcolax, (both oral and rectal suppository), Ex-Lax, Correctol, etc.   Non-stimulant laxatives include Milk of Magnesia, and glycerin suppositories.      Nitesh Mendez,     If you are due for any health screening(s) below please notify me so we can arrange them to be ordered and scheduled. Most healthy patients at your age complete them, but you are free to accept or refuse.     If you can't do it, I'll definitely understand. If you can, I'd certainly appreciate it!    Tests to Keep You Healthy    Mammogram: Met on 12/22/2023  Cervical Cancer Screening: DUE  Last Blood Pressure <= 139/89 (11/4/2024): Yes      Your cervical cancer screening is due     Our records indicate that you may be overdue for your screening Pap smear. A Pap smear is an important health screening that can detect abnormal cells that can become cervical cancer. Cervical cancer screenings allow for early diagnosis and increase the likelihood of successful treatment.     The current  recommendation for Pap smear screening is every 3-5 years for women at average risk. We encourage you to schedule your appointment with your [facility] health provider. Many women see a gynecologist for this screening, but some primary care providers also provide Pap screening.     If you recently had your Pap smear screening performed outside of [facility], please let your health care team know so that they can update your health record.

## 2024-12-23 ENCOUNTER — HOSPITAL ENCOUNTER (OUTPATIENT)
Dept: RADIOLOGY | Facility: HOSPITAL | Age: 44
Discharge: HOME | End: 2024-12-23
Payer: COMMERCIAL

## 2024-12-23 DIAGNOSIS — Z12.31 OTHER SCREENING MAMMOGRAM: ICD-10-CM

## 2025-01-29 DIAGNOSIS — F41.0 SEVERE ANXIETY WITH PANIC: ICD-10-CM

## 2025-01-29 RX ORDER — ALPRAZOLAM 0.5 MG/1
TABLET ORAL
Qty: 60 TABLET | Refills: 2 | Status: SHIPPED | OUTPATIENT
Start: 2025-01-29

## 2025-03-17 DIAGNOSIS — R11.0 NAUSEA: ICD-10-CM

## 2025-03-17 RX ORDER — ONDANSETRON 4 MG/1
4 TABLET, ORALLY DISINTEGRATING ORAL EVERY 8 HOURS PRN
Qty: 20 TABLET | Refills: 1 | Status: SHIPPED | OUTPATIENT
Start: 2025-03-17

## 2025-04-09 ENCOUNTER — TELEPHONE (OUTPATIENT)
Dept: FAMILY MEDICINE | Facility: CLINIC | Age: 45
End: 2025-04-09
Payer: COMMERCIAL

## 2025-04-09 DIAGNOSIS — Z00.00 ROUTINE GENERAL MEDICAL EXAMINATION AT A HEALTH CARE FACILITY: ICD-10-CM

## 2025-04-09 DIAGNOSIS — I10 ESSENTIAL HYPERTENSION: Primary | ICD-10-CM

## 2025-04-09 DIAGNOSIS — Z79.899 ENCOUNTER FOR LONG-TERM (CURRENT) USE OF MEDICATIONS: ICD-10-CM

## 2025-04-17 ENCOUNTER — OFFICE VISIT (OUTPATIENT)
Dept: FAMILY MEDICINE | Facility: CLINIC | Age: 45
End: 2025-04-17
Payer: COMMERCIAL

## 2025-04-17 VITALS — HEART RATE: 68 BPM | WEIGHT: 234.81 LBS | OXYGEN SATURATION: 98 % | BODY MASS INDEX: 39.12 KG/M2 | HEIGHT: 65 IN

## 2025-04-17 DIAGNOSIS — H15.002 SCLERITIS OF LEFT EYE: Primary | ICD-10-CM

## 2025-04-17 DIAGNOSIS — Z01.419 WELL WOMAN EXAM: ICD-10-CM

## 2025-04-17 DIAGNOSIS — Z12.31 OTHER SCREENING MAMMOGRAM: ICD-10-CM

## 2025-04-17 PROCEDURE — 3061F NEG MICROALBUMINURIA REV: CPT | Mod: CPTII,S$GLB,,

## 2025-04-17 PROCEDURE — 1159F MED LIST DOCD IN RCRD: CPT | Mod: CPTII,S$GLB,,

## 2025-04-17 PROCEDURE — 99213 OFFICE O/P EST LOW 20 MIN: CPT | Mod: S$GLB,,,

## 2025-04-17 PROCEDURE — 3008F BODY MASS INDEX DOCD: CPT | Mod: CPTII,S$GLB,,

## 2025-04-17 PROCEDURE — 3066F NEPHROPATHY DOC TX: CPT | Mod: CPTII,S$GLB,,

## 2025-04-17 RX ORDER — NEOMYCIN SULFATE, POLYMYXIN B SULFATE AND DEXAMETHASONE 3.5; 10000; 1 MG/ML; [USP'U]/ML; MG/ML
1 SUSPENSION/ DROPS OPHTHALMIC 3 TIMES DAILY
Qty: 5 ML | Refills: 0 | Status: SHIPPED | OUTPATIENT
Start: 2025-04-17 | End: 2025-04-22

## 2025-04-17 NOTE — PROGRESS NOTES
SUBJECTIVE:    Patient ID: Vanessa Flower is a 44 y.o. female.    Chief Complaint: Eye Problem    HPI  History of Present Illness    CHIEF COMPLAINT:  Vanessa presents today with red eye.    OCULAR SYMPTOMS:  She reports red eye symptoms ongoing for 3 weeks with nighttime irritation and watering. Symptoms worsened with wind exposure during recent camping trip. She denies grittiness or sticky sensation.    ALLERGIES:  She reports sneezing with recent worsening during windy conditions.    CURRENT MEDICATIONS:  She is using OTC lubricating eye drops for eye irritation and redness, and Benadryl for presumed allergies.      ROS:  General: -fever, -chills, -fatigue, -weight gain, -weight loss  Eyes: -vision changes, +redness, -discharge, +eye watering, +eye irritation  ENT: -ear pain, -nasal congestion, -sore throat  Cardiovascular: -chest pain, -palpitations, -lower extremity edema  Respiratory: -cough, -shortness of breath  Gastrointestinal: -abdominal pain, -nausea, -vomiting, -diarrhea, -constipation, -blood in stool  Genitourinary: -dysuria, -hematuria, -frequency  Musculoskeletal: -joint pain, -muscle pain  Skin: -rash, -lesion  Neurological: -headache, -dizziness, -numbness, -tingling  Psychiatric: -anxiety, -depression, -sleep difficulty  Allergic: +frequent sneezing         No visits with results within 6 Month(s) from this visit.   Latest known visit with results is:   Office Visit on 2024   Component Date Value Ref Range Status    POC Molecular Influenza A Ag 2024 Negative  Negative, Not Reported Final    POC Molecular Influenza B Ag 2024 Negative  Negative, Not Reported Final     Acceptable 2024 Yes   Final       Past Medical History:   Diagnosis Date    Hypertension     Obese     Panic attack     Sciatica      Social History[1]  Past Surgical History:   Procedure Laterality Date    ANKLE FRACTURE SURGERY       SECTION      CHOLECYSTECTOMY      FEMUR  "FRACTURE SURGERY      gastric sleeve  11/08/2023    PATELLA FRACTURE SURGERY       No family history on file.    The 10-year CVD risk score (FLORIAN'Agoclaire, et al., 2008) is: 1.5%    Values used to calculate the score:      Age: 44 years      Sex: Female      Diabetic: No      Tobacco smoker: No      Systolic Blood Pressure: 100 mmHg      Is BP treated: Yes      HDL Cholesterol: 66 mg/dL      Total Cholesterol: 171 mg/dL    Tests to Keep You Healthy    Mammogram: ORDERED BUT NOT SCHEDULED  Cervical Cancer Screening: DUE  Last Blood Pressure <= 139/89 (4/17/2025): Yes      Review of patient's allergies indicates:  No Known Allergies  Current Medications[2]    Review of Systems        Objective:      Vitals:    04/17/25 0901   BP: (P) 100/64   Pulse: 68   SpO2: 98%   Weight: 106.5 kg (234 lb 12.8 oz)   Height: 5' 5" (1.651 m)     Physical Exam  Physical Exam    Constitutional: In no acute distress. Normal appearance. Well-developed. Not ill-appearing.  HENT: Normocephalic. Atraumatic. External ears normal bilaterally. Nose normal. Normal lips. Oropharynx clear.  Eyes: No scleral icterus. Pupils are equal, round, and reactive to light. Erythematous eyeball.  Neck: No thyromegaly. No carotid bruit.  Cardiovascular: Normal rate and regular rhythm. Radial pulses are 2+ bilaterally. Normal heart sounds. No murmur heard.  Pulmonary: Pulmonary effort is normal. Normal breath sounds.  Abdomen: Bowel sounds are normal. Abdomen is soft. No abdominal tenderness.  Musculoskeletal: Normal range of motion at all joints. Normal range of motion of the cervical back. No lumbar spasms. No lower extremity edema bilaterally.  Skin: Warm. Dry. Capillary refill takes less than 2 seconds.  Neurological: No focal deficit present. Alert and oriented to person, place, and time. No cranial nerve deficit. Sensation intact. No motor weakness. Gait is intact.  Psychiatric: Attention normal. Mood normal. Speech normal. Behavior is cooperative. No " homicidal ideation. No suicidal ideation.           Assessment:       1. Scleritis of left eye    2. Other screening mammogram    3. Well woman exam         Plan:       Scleritis of left eye  -     Ambulatory referral/consult to Ophthalmology; Future; Expected date: 04/24/2025  -     neomycin-polymyxin-dexamethasone (MAXITROL) 3.5mg/mL-10,000 unit/mL-0.1 % DrpS; Place 1 drop into the left eye 3 (three) times daily. for 5 days  Dispense: 5 mL; Refill: 0    Other screening mammogram  -     Mammo Digital Screening Bilat w/ Jason (XPD); Future; Expected date: 04/17/2025    Well woman exam  -     Ambulatory referral/consult to Obstetrics / Gynecology; Future; Expected date: 04/24/2025      Assessment & Plan    H10.30 Unspecified acute conjunctivitis, unspecified eye  J30.89 Other allergic rhinitis    IMPRESSION:  - Assessed eye condition: red eyeball for 3 weeks, nighttime irritation, no constant tearing or light sensitivity.  - Symptoms not fully consistent with allergic etiology.  - Condition not emergent but warrants further evaluation by an eye specialist.    ACUTE CONJUNCTIVITIS:  - Observed red eyeball without any apparent foreign object during exam.  - Prescribed Maxitrol eye drops containing steroid, antibacterial, and antiviral components to address potential causes of conjunctivitis.  - Advised the patient to continue moisturizing the eye as previously instructed.  - Vanessa reports red eye for 3 weeks, with watering and irritation at nighttime, but denies itching, scratching, or gritty feeling in the eye.  - Recommend continuing moisturizing eye drops and antihistamine use.    ALLERGIC RHINITIS:  - Considered the possibility of allergies and assessed the need for eye doctor evaluation.  - Discussed the effectiveness of systemic antihistamines in managing allergy symptoms such as sneezing or rhinorrhea.  - Advised the patient to use systemic antihistamine if experiencing sneezing or rhinorrhea.  - Vanessa  reports sneezing as an allergy symptom and worsening of symptoms due to wind exposure during camping.  - Acknowledged the patient's use of diphenhydramine for allergy symptoms.  - Mentioned the availability of Pataday antihistamine eye drops but noted potential stinging side effect.    PATIENT EDUCATION AND REFERRAL:  - Educated the patient about the difference between optometrists and ophthalmologists.  - Referred the patient to [facility] [id number] for ophthalmological evaluation.         Follow up if symptoms worsen or fail to improve.        This note was generated with the assistance of ambient listening technology. Verbal consent was obtained by the patient and accompanying visitor(s) for the recording of patient appointment to facilitate this note. I attest to having reviewed and edited the generated note for accuracy, though some syntax or spelling errors may persist. Please contact the author of this note for any clarification.      4/17/2025 Juhi Ruiz         [1]   Social History  Socioeconomic History    Marital status: Single   Tobacco Use    Smoking status: Never    Smokeless tobacco: Never   Substance and Sexual Activity    Alcohol use: Not Currently    Drug use: Never     Social Drivers of Health     Financial Resource Strain: Medium Risk (4/16/2025)    Overall Financial Resource Strain (CARDIA)     Difficulty of Paying Living Expenses: Somewhat hard   Food Insecurity: Food Insecurity Present (4/16/2025)    Hunger Vital Sign     Worried About Running Out of Food in the Last Year: Never true     Ran Out of Food in the Last Year: Sometimes true   Transportation Needs: No Transportation Needs (4/16/2025)    PRAPARE - Transportation     Lack of Transportation (Medical): No     Lack of Transportation (Non-Medical): No   Physical Activity: Unknown (4/16/2025)    Exercise Vital Sign     Days of Exercise per Week: 3 days   Stress: Stress Concern Present (4/16/2025)    [employer] -  Occupational Stress Questionnaire     Feeling of Stress : To some extent   Housing Stability: Low Risk  (4/16/2025)    Housing Stability Vital Sign     Unable to Pay for Housing in the Last Year: No     Homeless in the Last Year: No   [2]   Current Outpatient Medications:     ALPRAZolam (XANAX) 0.5 MG tablet, Take a half tablet to one whole tablet twice a day as needed for acute panic or anxiety., Disp: 60 tablet, Rfl: 2    amLODIPine (NORVASC) 10 MG tablet, Take 1 tablet (10 mg total) by mouth once daily., Disp: 90 tablet, Rfl: 3    ascorbic acid, vitamin C, (VITAMIN C) 1000 MG tablet, Take 1,000 mg by mouth once daily., Disp: , Rfl:     b complex vitamins capsule, Take 1 capsule by mouth once daily., Disp: , Rfl:     busPIRone (BUSPAR) 5 MG Tab, Take 1 tablet (5 mg total) by mouth 2 (two) times daily as needed (anxiety)., Disp: 180 tablet, Rfl: 3    calcium-vitamin D3 (OS-ISSA 500 + D3) 500 mg-5 mcg (200 unit) per tablet, Take 1 tablet by mouth 2 (two) times daily with meals., Disp: , Rfl:     gabapentin (NEURONTIN) 100 MG capsule, Take 1 capsule (100 mg total) by mouth 3 (three) times daily as needed (nerve pain)., Disp: 90 capsule, Rfl: 3    hydroCHLOROthiazide (HYDRODIURIL) 12.5 MG Tab, Take 1 tablet (12.5 mg total) by mouth once daily., Disp: 90 tablet, Rfl: 3    magnesium oxide 200 mg magnesium Tab, Take 2 tablets by mouth every evening., Disp: , Rfl:     multivit with iron,minerals (FLINTSTONES COMPLETE, IRON, ORAL), Take by mouth Daily., Disp: , Rfl:     ondansetron (ZOFRAN-ODT) 4 MG TbDL, Take 1 tablet (4 mg total) by mouth every 8 (eight) hours as needed (nausea)., Disp: 20 tablet, Rfl: 1    neomycin-polymyxin-dexamethasone (MAXITROL) 3.5mg/mL-10,000 unit/mL-0.1 % DrpS, Place 1 drop into the left eye 3 (three) times daily. for 5 days, Disp: 5 mL, Rfl: 0

## 2025-04-22 ENCOUNTER — RESULTS FOLLOW-UP (OUTPATIENT)
Dept: FAMILY MEDICINE | Facility: CLINIC | Age: 45
End: 2025-04-22

## 2025-04-22 NOTE — PROGRESS NOTES
Labs look fine, except urine, which looks like could be a contaminated sample, as in, perhaps pre cleanse wipes were not used. No concerns on the slightly low WBC, AST or ALT, and the creatinine in the urine is likely from fasting, as there were ketones in her ua dip.

## 2025-04-23 NOTE — TELEPHONE ENCOUNTER
----- Message from Melissa sent at 4/23/2025  3:18 PM CDT -----  Returning a phone call [phone]

## 2025-05-21 DIAGNOSIS — F41.0 SEVERE ANXIETY WITH PANIC: ICD-10-CM

## 2025-05-21 RX ORDER — ALPRAZOLAM 0.5 MG/1
TABLET ORAL
Qty: 60 TABLET | Refills: 2 | Status: SHIPPED | OUTPATIENT
Start: 2025-05-21

## 2025-05-26 ENCOUNTER — TELEPHONE (OUTPATIENT)
Dept: FAMILY MEDICINE | Facility: CLINIC | Age: 45
End: 2025-05-26
Payer: COMMERCIAL